# Patient Record
Sex: FEMALE | NOT HISPANIC OR LATINO | Employment: FULL TIME
[De-identification: names, ages, dates, MRNs, and addresses within clinical notes are randomized per-mention and may not be internally consistent; named-entity substitution may affect disease eponyms.]

---

## 2017-07-08 ENCOUNTER — HEALTH MAINTENANCE LETTER (OUTPATIENT)
Age: 37
End: 2017-07-08

## 2017-11-12 ENCOUNTER — HEALTH MAINTENANCE LETTER (OUTPATIENT)
Age: 37
End: 2017-11-12

## 2018-04-16 ENCOUNTER — TRANSFERRED RECORDS (OUTPATIENT)
Dept: HEALTH INFORMATION MANAGEMENT | Facility: CLINIC | Age: 38
End: 2018-04-16

## 2019-09-30 PROBLEM — N92.1 IRREGULAR INTERMENSTRUAL BLEEDING: Status: ACTIVE | Noted: 2019-09-30

## 2019-09-30 PROBLEM — R10.2 PELVIC PAIN: Status: ACTIVE | Noted: 2019-09-30

## 2019-10-04 ENCOUNTER — ANESTHESIA EVENT (OUTPATIENT)
Dept: SURGERY | Facility: CLINIC | Age: 39
End: 2019-10-04
Payer: COMMERCIAL

## 2019-10-04 ENCOUNTER — ANESTHESIA (OUTPATIENT)
Dept: SURGERY | Facility: CLINIC | Age: 39
End: 2019-10-04
Payer: COMMERCIAL

## 2019-10-04 ENCOUNTER — HOSPITAL ENCOUNTER (OUTPATIENT)
Facility: CLINIC | Age: 39
Discharge: HOME | End: 2019-10-04
Attending: OBSTETRICS & GYNECOLOGY | Admitting: OBSTETRICS & GYNECOLOGY
Payer: COMMERCIAL

## 2019-10-04 VITALS
RESPIRATION RATE: 14 BRPM | DIASTOLIC BLOOD PRESSURE: 78 MMHG | WEIGHT: 165.3 LBS | SYSTOLIC BLOOD PRESSURE: 122 MMHG | BODY MASS INDEX: 26.57 KG/M2 | OXYGEN SATURATION: 96 % | TEMPERATURE: 97.7 F | HEART RATE: 72 BPM | HEIGHT: 66 IN

## 2019-10-04 DIAGNOSIS — N92.1 IRREGULAR INTERMENSTRUAL BLEEDING: Primary | ICD-10-CM

## 2019-10-04 LAB
ABO + RH BLD: NORMAL
ABO + RH BLD: NORMAL
B-HCG SERPL-ACNC: <1 IU/L (ref 0–5)
BLD GP AB SCN SERPL QL: NORMAL
BLOOD BANK CMNT PATIENT-IMP: NORMAL
HGB BLD-MCNC: 13.1 G/DL (ref 11.7–15.7)
SPECIMEN EXP DATE BLD: NORMAL

## 2019-10-04 PROCEDURE — 25000128 H RX IP 250 OP 636: Performed by: OBSTETRICS & GYNECOLOGY

## 2019-10-04 PROCEDURE — 40000170 ZZH STATISTIC PRE-PROCEDURE ASSESSMENT II: Performed by: OBSTETRICS & GYNECOLOGY

## 2019-10-04 PROCEDURE — 25000125 ZZHC RX 250: Performed by: OBSTETRICS & GYNECOLOGY

## 2019-10-04 PROCEDURE — 25800030 ZZH RX IP 258 OP 636: Performed by: ANESTHESIOLOGY

## 2019-10-04 PROCEDURE — 27210794 ZZH OR GENERAL SUPPLY STERILE: Performed by: OBSTETRICS & GYNECOLOGY

## 2019-10-04 PROCEDURE — 85018 HEMOGLOBIN: CPT | Performed by: OBSTETRICS & GYNECOLOGY

## 2019-10-04 PROCEDURE — 36415 COLL VENOUS BLD VENIPUNCTURE: CPT | Performed by: OBSTETRICS & GYNECOLOGY

## 2019-10-04 PROCEDURE — 86900 BLOOD TYPING SEROLOGIC ABO: CPT | Performed by: OBSTETRICS & GYNECOLOGY

## 2019-10-04 PROCEDURE — 86901 BLOOD TYPING SEROLOGIC RH(D): CPT | Performed by: OBSTETRICS & GYNECOLOGY

## 2019-10-04 PROCEDURE — 36000058 ZZH SURGERY LEVEL 3 EA 15 ADDTL MIN: Performed by: OBSTETRICS & GYNECOLOGY

## 2019-10-04 PROCEDURE — 71000013 ZZH RECOVERY PHASE 1 LEVEL 1 EA ADDTL HR: Performed by: OBSTETRICS & GYNECOLOGY

## 2019-10-04 PROCEDURE — 37000008 ZZH ANESTHESIA TECHNICAL FEE, 1ST 30 MIN: Performed by: OBSTETRICS & GYNECOLOGY

## 2019-10-04 PROCEDURE — 36000056 ZZH SURGERY LEVEL 3 1ST 30 MIN: Performed by: OBSTETRICS & GYNECOLOGY

## 2019-10-04 PROCEDURE — 88305 TISSUE EXAM BY PATHOLOGIST: CPT | Mod: 26 | Performed by: OBSTETRICS & GYNECOLOGY

## 2019-10-04 PROCEDURE — 37000009 ZZH ANESTHESIA TECHNICAL FEE, EACH ADDTL 15 MIN: Performed by: OBSTETRICS & GYNECOLOGY

## 2019-10-04 PROCEDURE — 71000012 ZZH RECOVERY PHASE 1 LEVEL 1 FIRST HR: Performed by: OBSTETRICS & GYNECOLOGY

## 2019-10-04 PROCEDURE — 88302 TISSUE EXAM BY PATHOLOGIST: CPT | Performed by: OBSTETRICS & GYNECOLOGY

## 2019-10-04 PROCEDURE — 25000125 ZZHC RX 250: Performed by: NURSE ANESTHETIST, CERTIFIED REGISTERED

## 2019-10-04 PROCEDURE — 25000128 H RX IP 250 OP 636: Performed by: NURSE ANESTHETIST, CERTIFIED REGISTERED

## 2019-10-04 PROCEDURE — 25000128 H RX IP 250 OP 636: Performed by: ANESTHESIOLOGY

## 2019-10-04 PROCEDURE — 88305 TISSUE EXAM BY PATHOLOGIST: CPT | Performed by: OBSTETRICS & GYNECOLOGY

## 2019-10-04 PROCEDURE — 86850 RBC ANTIBODY SCREEN: CPT | Performed by: OBSTETRICS & GYNECOLOGY

## 2019-10-04 PROCEDURE — 71000027 ZZH RECOVERY PHASE 2 EACH 15 MINS: Performed by: OBSTETRICS & GYNECOLOGY

## 2019-10-04 PROCEDURE — 25000132 ZZH RX MED GY IP 250 OP 250 PS 637: Performed by: OBSTETRICS & GYNECOLOGY

## 2019-10-04 PROCEDURE — 25000566 ZZH SEVOFLURANE, EA 15 MIN: Performed by: OBSTETRICS & GYNECOLOGY

## 2019-10-04 PROCEDURE — 88302 TISSUE EXAM BY PATHOLOGIST: CPT | Mod: 26 | Performed by: OBSTETRICS & GYNECOLOGY

## 2019-10-04 PROCEDURE — 84702 CHORIONIC GONADOTROPIN TEST: CPT | Performed by: OBSTETRICS & GYNECOLOGY

## 2019-10-04 RX ORDER — ONDANSETRON 4 MG/1
4 TABLET, ORALLY DISINTEGRATING ORAL EVERY 30 MIN PRN
Status: DISCONTINUED | OUTPATIENT
Start: 2019-10-04 | End: 2019-10-04 | Stop reason: HOSPADM

## 2019-10-04 RX ORDER — CEFAZOLIN SODIUM 1 G/3ML
1 INJECTION, POWDER, FOR SOLUTION INTRAMUSCULAR; INTRAVENOUS SEE ADMIN INSTRUCTIONS
Status: DISCONTINUED | OUTPATIENT
Start: 2019-10-04 | End: 2019-10-04 | Stop reason: HOSPADM

## 2019-10-04 RX ORDER — KETOROLAC TROMETHAMINE 30 MG/ML
INJECTION, SOLUTION INTRAMUSCULAR; INTRAVENOUS PRN
Status: DISCONTINUED | OUTPATIENT
Start: 2019-10-04 | End: 2019-10-04

## 2019-10-04 RX ORDER — BUPIVACAINE HYDROCHLORIDE 5 MG/ML
INJECTION, SOLUTION EPIDURAL; INTRACAUDAL
Status: DISCONTINUED
Start: 2019-10-04 | End: 2019-10-04 | Stop reason: HOSPADM

## 2019-10-04 RX ORDER — SODIUM CHLORIDE, SODIUM LACTATE, POTASSIUM CHLORIDE, CALCIUM CHLORIDE 600; 310; 30; 20 MG/100ML; MG/100ML; MG/100ML; MG/100ML
INJECTION, SOLUTION INTRAVENOUS CONTINUOUS
Status: DISCONTINUED | OUTPATIENT
Start: 2019-10-04 | End: 2019-10-04 | Stop reason: HOSPADM

## 2019-10-04 RX ORDER — FENTANYL CITRATE 50 UG/ML
25-50 INJECTION, SOLUTION INTRAMUSCULAR; INTRAVENOUS EVERY 5 MIN PRN
Status: DISCONTINUED | OUTPATIENT
Start: 2019-10-04 | End: 2019-10-04 | Stop reason: HOSPADM

## 2019-10-04 RX ORDER — CEFAZOLIN SODIUM 2 G/100ML
2 INJECTION, SOLUTION INTRAVENOUS
Status: COMPLETED | OUTPATIENT
Start: 2019-10-04 | End: 2019-10-04

## 2019-10-04 RX ORDER — ONDANSETRON 2 MG/ML
INJECTION INTRAMUSCULAR; INTRAVENOUS PRN
Status: DISCONTINUED | OUTPATIENT
Start: 2019-10-04 | End: 2019-10-04

## 2019-10-04 RX ORDER — LIDOCAINE HYDROCHLORIDE 20 MG/ML
INJECTION, SOLUTION INFILTRATION; PERINEURAL PRN
Status: DISCONTINUED | OUTPATIENT
Start: 2019-10-04 | End: 2019-10-04

## 2019-10-04 RX ORDER — ONDANSETRON 2 MG/ML
4 INJECTION INTRAMUSCULAR; INTRAVENOUS EVERY 30 MIN PRN
Status: DISCONTINUED | OUTPATIENT
Start: 2019-10-04 | End: 2019-10-04 | Stop reason: HOSPADM

## 2019-10-04 RX ORDER — IBUPROFEN 600 MG/1
600 TABLET, FILM COATED ORAL EVERY 6 HOURS PRN
Qty: 30 TABLET | Refills: 0 | Status: SHIPPED | OUTPATIENT
Start: 2019-10-04

## 2019-10-04 RX ORDER — NALOXONE HYDROCHLORIDE 0.4 MG/ML
.1-.4 INJECTION, SOLUTION INTRAMUSCULAR; INTRAVENOUS; SUBCUTANEOUS
Status: DISCONTINUED | OUTPATIENT
Start: 2019-10-04 | End: 2019-10-04 | Stop reason: HOSPADM

## 2019-10-04 RX ORDER — FENTANYL CITRATE 50 UG/ML
INJECTION, SOLUTION INTRAMUSCULAR; INTRAVENOUS PRN
Status: DISCONTINUED | OUTPATIENT
Start: 2019-10-04 | End: 2019-10-04

## 2019-10-04 RX ORDER — HYDROMORPHONE HYDROCHLORIDE 2 MG/1
2 TABLET ORAL EVERY 4 HOURS PRN
Qty: 15 TABLET | Refills: 0 | Status: SHIPPED | OUTPATIENT
Start: 2019-10-04

## 2019-10-04 RX ORDER — PROPOFOL 10 MG/ML
INJECTION, EMULSION INTRAVENOUS CONTINUOUS PRN
Status: DISCONTINUED | OUTPATIENT
Start: 2019-10-04 | End: 2019-10-04

## 2019-10-04 RX ORDER — PROPOFOL 10 MG/ML
INJECTION, EMULSION INTRAVENOUS PRN
Status: DISCONTINUED | OUTPATIENT
Start: 2019-10-04 | End: 2019-10-04

## 2019-10-04 RX ORDER — DEXAMETHASONE SODIUM PHOSPHATE 4 MG/ML
INJECTION, SOLUTION INTRA-ARTICULAR; INTRALESIONAL; INTRAMUSCULAR; INTRAVENOUS; SOFT TISSUE PRN
Status: DISCONTINUED | OUTPATIENT
Start: 2019-10-04 | End: 2019-10-04

## 2019-10-04 RX ORDER — HYDROMORPHONE HYDROCHLORIDE 1 MG/ML
.3-.5 INJECTION, SOLUTION INTRAMUSCULAR; INTRAVENOUS; SUBCUTANEOUS EVERY 10 MIN PRN
Status: DISCONTINUED | OUTPATIENT
Start: 2019-10-04 | End: 2019-10-04 | Stop reason: HOSPADM

## 2019-10-04 RX ORDER — ACETAMINOPHEN 325 MG/1
975 TABLET ORAL ONCE
Status: COMPLETED | OUTPATIENT
Start: 2019-10-04 | End: 2019-10-04

## 2019-10-04 RX ORDER — BUPIVACAINE HYDROCHLORIDE 5 MG/ML
INJECTION, SOLUTION PERINEURAL PRN
Status: DISCONTINUED | OUTPATIENT
Start: 2019-10-04 | End: 2019-10-04 | Stop reason: HOSPADM

## 2019-10-04 RX ORDER — KETOROLAC TROMETHAMINE 30 MG/ML
30 INJECTION, SOLUTION INTRAMUSCULAR; INTRAVENOUS ONCE
Status: DISCONTINUED | OUTPATIENT
Start: 2019-10-04 | End: 2019-10-04 | Stop reason: HOSPADM

## 2019-10-04 RX ORDER — VECURONIUM BROMIDE 1 MG/ML
INJECTION, POWDER, LYOPHILIZED, FOR SOLUTION INTRAVENOUS PRN
Status: DISCONTINUED | OUTPATIENT
Start: 2019-10-04 | End: 2019-10-04

## 2019-10-04 RX ADMIN — SODIUM CHLORIDE, POTASSIUM CHLORIDE, SODIUM LACTATE AND CALCIUM CHLORIDE: 600; 310; 30; 20 INJECTION, SOLUTION INTRAVENOUS at 07:42

## 2019-10-04 RX ADMIN — ONDANSETRON 4 MG: 2 INJECTION INTRAMUSCULAR; INTRAVENOUS at 07:50

## 2019-10-04 RX ADMIN — FENTANYL CITRATE 50 MCG: 0.05 INJECTION, SOLUTION INTRAMUSCULAR; INTRAVENOUS at 09:16

## 2019-10-04 RX ADMIN — LIDOCAINE HYDROCHLORIDE 100 MG: 20 INJECTION, SOLUTION INFILTRATION; PERINEURAL at 07:44

## 2019-10-04 RX ADMIN — FENTANYL CITRATE 50 MCG: 50 INJECTION, SOLUTION INTRAMUSCULAR; INTRAVENOUS at 07:44

## 2019-10-04 RX ADMIN — CEFAZOLIN SODIUM 2 G: 2 INJECTION, SOLUTION INTRAVENOUS at 07:50

## 2019-10-04 RX ADMIN — VECURONIUM BROMIDE 2 MG: 1 INJECTION, POWDER, LYOPHILIZED, FOR SOLUTION INTRAVENOUS at 08:25

## 2019-10-04 RX ADMIN — PROPOFOL 200 MG: 10 INJECTION, EMULSION INTRAVENOUS at 07:44

## 2019-10-04 RX ADMIN — FENTANYL CITRATE 50 MCG: 0.05 INJECTION, SOLUTION INTRAMUSCULAR; INTRAVENOUS at 09:44

## 2019-10-04 RX ADMIN — SUGAMMADEX 150 MG: 100 INJECTION, SOLUTION INTRAVENOUS at 08:49

## 2019-10-04 RX ADMIN — DEXAMETHASONE SODIUM PHOSPHATE 4 MG: 4 INJECTION, SOLUTION INTRA-ARTICULAR; INTRALESIONAL; INTRAMUSCULAR; INTRAVENOUS; SOFT TISSUE at 07:50

## 2019-10-04 RX ADMIN — ACETAMINOPHEN 975 MG: 325 TABLET, FILM COATED ORAL at 06:38

## 2019-10-04 RX ADMIN — ROCURONIUM BROMIDE 10 MG: 10 INJECTION INTRAVENOUS at 08:13

## 2019-10-04 RX ADMIN — FENTANYL CITRATE 50 MCG: 50 INJECTION, SOLUTION INTRAMUSCULAR; INTRAVENOUS at 08:13

## 2019-10-04 RX ADMIN — ROCURONIUM BROMIDE 40 MG: 10 INJECTION INTRAVENOUS at 07:44

## 2019-10-04 RX ADMIN — SODIUM CHLORIDE, POTASSIUM CHLORIDE, SODIUM LACTATE AND CALCIUM CHLORIDE: 600; 310; 30; 20 INJECTION, SOLUTION INTRAVENOUS at 08:42

## 2019-10-04 RX ADMIN — KETOROLAC TROMETHAMINE 30 MG: 30 INJECTION, SOLUTION INTRAMUSCULAR at 08:47

## 2019-10-04 RX ADMIN — PROPOFOL 75 MCG/KG/MIN: 10 INJECTION, EMULSION INTRAVENOUS at 07:44

## 2019-10-04 RX ADMIN — MIDAZOLAM 1 MG: 1 INJECTION INTRAMUSCULAR; INTRAVENOUS at 07:44

## 2019-10-04 ASSESSMENT — LIFESTYLE VARIABLES: TOBACCO_USE: 0

## 2019-10-04 ASSESSMENT — MIFFLIN-ST. JEOR: SCORE: 1436.55

## 2019-10-04 ASSESSMENT — COPD QUESTIONNAIRES: COPD: 0

## 2019-10-04 NOTE — ANESTHESIA CARE TRANSFER NOTE
Patient: Melina A Vazquez    Procedure(s):  HYSTEROSCOPY, WITH DILATION AND CURETTAGE OF UTERUS,  POLYPECTOMY USING GRIDER AND NEPHEW MORCELLATOR  DIAGNOSTIC LAPAROSCOPY WITH left salpingectomy    Diagnosis: ABNORMAL UTERINE BLEEDING AND PELVIC CRAMPING, ENDOMETRIAL POLYPS  Diagnosis Additional Information: No value filed.    Anesthesia Type:   General, ETT     Note:  Airway :Face Mask  Patient transferred to:PACU  Handoff Report: Identifed the Patient, Identified the Reponsible Provider, Reviewed the pertinent medical history, Discussed the surgical course, Reviewed Intra-OP anesthesia mangement and issues during anesthesia, Set expectations for post-procedure period and Allowed opportunity for questions and acknowledgement of understanding      Vitals: (Last set prior to Anesthesia Care Transfer)    CRNA VITALS  10/4/2019 0830 - 10/4/2019 0906      10/4/2019             Pulse:  77    SpO2:  97 %    Resp Rate (set):  10                Electronically Signed By: ALYSON Gutiérrez CRNA  October 4, 2019  9:06 AM

## 2019-10-04 NOTE — ANESTHESIA POSTPROCEDURE EVALUATION
Patient: Melina A Vazquez    Procedure(s):  HYSTEROSCOPY, WITH DILATION AND CURETTAGE OF UTERUS,  POLYPECTOMY USING GRIDER AND NEPHEW MORCELLATOR  DIAGNOSTIC LAPAROSCOPY WITH left salpingectomy    Diagnosis:ABNORMAL UTERINE BLEEDING AND PELVIC CRAMPING, ENDOMETRIAL POLYPS  Diagnosis Additional Information: No value filed.    Anesthesia Type:  General, ETT    Note:  Anesthesia Post Evaluation    Patient location during evaluation: Phase 2  Patient participation: Able to fully participate in evaluation  Level of consciousness: awake and alert  Pain management: adequate  Airway patency: patent  Cardiovascular status: acceptable  Respiratory status: acceptable  Hydration status: acceptable  PONV: none     Anesthetic complications: None          Last vitals:  Vitals:    10/04/19 1015 10/04/19 1030 10/04/19 1100   BP: 104/75 102/67 122/78   Pulse: 63 72    Resp: 10 11 14   Temp:      SpO2: 94% 95% 96%         Electronically Signed By: Bobby Reynolds MD  October 4, 2019  12:16 PM

## 2019-10-04 NOTE — ANESTHESIA PREPROCEDURE EVALUATION
Anesthesia Pre-Procedure Evaluation    Patient: Melina Vazquez   MRN: [id number] : 1980          Preoperative Diagnosis: ABNORMAL UTERINE BLEEDING AND PELVIC CRAMPING, ENDOMETRIAL POLYPS    Procedure(s):  HYSTEROSCOPY, WITH DILATION AND CURETTAGE OF UTERUS,  POLYPECTOMY USING GRIDER AND NEPHEW MORCELLATOR  DIAGNOSTIC LAPAROSCOPY WITH POSSIBLE REMOVAL OF ENDOMETRIOSIS    Past Medical History:   Diagnosis Date     Anxiety and depression      Arthritis     foot     Cancer of lip      CYP2D6 deficiency (H)      Dalton-Danlos syndrome     POSSIBLE     Irregular menstrual bleeding      Pelvic cramping      Past Surgical History:   Procedure Laterality Date     ENT SURGERY      T & A     GYN SURGERY       laparoscopy- Left Tubal Cyst     ORTHOPEDIC SURGERY      FOOT SURGERY- 6 LEFT, 1 RIGHT     SOFT TISSUE SURGERY      MOH'S SURGERY- LIP       Anesthesia Evaluation     . Pt has had prior anesthetic. Type: General    No history of anesthetic complications          ROS/MED HX    ENT/Pulmonary:      (-) tobacco use, asthma and COPD   Neurologic:      (-) CVA, TIA and Neuropathy   Cardiovascular:        (-) hypertension, CAD, irregular heartbeat/palpitations and stent   METS/Exercise Tolerance:     Hematologic:        (-) anemia   Musculoskeletal: Comment: Chronic joint pain  Possible Dalton Danlos        GI/Hepatic:        (-) GERD and liver disease   Renal/Genitourinary:      (-) renal disease   Endo: Comment: [id number] slow metabolizer     (-) Type I DM, Type II DM and thyroid disease   Psychiatric:     (+) psychiatric history anxiety and depression      Infectious Disease:  - neg infectious disease ROS       Malignancy:   (+) Malignancy History of Skin          Other: Comment: Endometrial polyp  Abnormal uterine bleeding                           Physical Exam  Normal systems: cardiovascular, pulmonary and dental    Airway   Mallampati: II  TM distance: >3 FB  Neck ROM: full    Dental     Cardiovascular   Rhythm  "and rate: regular and normal      Pulmonary    breath sounds clear to auscultation            Lab Results   Component Value Date    WBC 5.7 12/18/2014    HGB 13.3 12/18/2014    HCT 40.8 12/18/2014     12/18/2014     04/11/2012    POTASSIUM 3.7 04/11/2012    CHLORIDE 104 04/11/2012    CO2 22 04/11/2012    BUN 19 04/11/2012    CR 0.71 04/11/2012     (H) 04/11/2012    AFSHAN 9.5 04/11/2012    ALBUMIN 4.3 04/11/2012    PROTTOTAL 7.1 04/11/2012    ALT 32 04/11/2012    AST 24 04/11/2012    ALKPHOS 93 04/11/2012    BILITOTAL 0.4 04/11/2012    LIPASE 94 04/11/2012    TSH 1.1 10/17/2013    HCG Negative 10/12/2007    HCGS Negative 04/11/2012       Preop Vitals  BP Readings from Last 3 Encounters:   10/04/19 115/81   12/18/14 103/68   11/14/14 106/80    Pulse Readings from Last 3 Encounters:   12/18/14 89   11/14/14 73   10/09/14 72      Resp Readings from Last 3 Encounters:   10/04/19 16   11/14/14 14   04/11/12 16    SpO2 Readings from Last 3 Encounters:   10/04/19 96%   04/11/12 95%      Temp Readings from Last 1 Encounters:   10/04/19 36.2  C (97.1  F) (Temporal)    Ht Readings from Last 1 Encounters:   10/04/19 1.676 m (5' 6\")      Wt Readings from Last 1 Encounters:   10/04/19 75 kg (165 lb 4.8 oz)    Estimated body mass index is 26.68 kg/m  as calculated from the following:    Height as of this encounter: 1.676 m (5' 6\").    Weight as of this encounter: 75 kg (165 lb 4.8 oz).       Anesthesia Plan      History & Physical Review  History and physical reviewed and following examination; no interval change.    ASA Status:  2 .    NPO Status:  > 6 hours    Plan for General and ETT with Intravenous induction.   PONV prophylaxis:  Ondansetron (or other 5HT-3) and Dexamethasone or Solumedrol       Postoperative Care  Postoperative pain management:  Oral pain medications and IV analgesics.      Consents  Anesthetic plan, risks, benefits and alternatives discussed with:  Patient..                 Bobby Reynolds, " MD

## 2019-10-04 NOTE — BRIEF OP NOTE
[facility] Brief Operative Note    Pre-operative diagnosis: ABNORMAL UTERINE BLEEDING AND PELVIC CRAMPING, ENDOMETRIAL POLYPS   Post-operative diagnosis same   Procedure: Procedure(s):  HYSTEROSCOPY, WITH DILATION AND CURETTAGE OF UTERUS,  POLYPECTOMY USING GRIDER AND NEPHEW MORCELLATOR  DIAGNOSTIC LAPAROSCOPY WITH left salpingectomy   Surgeon(s): Surgeon(s) and Role:  Panel 1:     * Nelia Claudio MD - Primary  Panel 2:     * Nelia Claudio MD - Primary   Estimated blood loss: 5 mL    Specimens: ID Type Source Tests Collected by Time Destination   A : Endometrial Curettings Tissue Endometrium SURGICAL PATHOLOGY EXAM Nelia Claudio MD 10/4/2019  8:14 AM    B : left fallopin tube Tissue Fallopian Tube, Left SURGICAL PATHOLOGY EXAM Nelia Claudio MD 10/4/2019  8:36 AM       Findings: Normal uterine cavity; pelvis with no endometriosis; right ovarian cyst 4cm drained; left tube blunted and moderate hydrosalpinx so removed; normal appendix and upper abdomen.    No complications; pt to Recovery room in stable condition.    Nelia Claudio M.D.  October 4, 2019   9:17 AM

## 2019-10-07 LAB — COPATH REPORT: NORMAL

## 2019-10-23 ENCOUNTER — TRANSFERRED RECORDS (OUTPATIENT)
Dept: HEALTH INFORMATION MANAGEMENT | Facility: CLINIC | Age: 39
End: 2019-10-23

## 2020-03-02 ENCOUNTER — HEALTH MAINTENANCE LETTER (OUTPATIENT)
Age: 40
End: 2020-03-02

## 2020-07-17 ENCOUNTER — HOSPITAL ENCOUNTER (OUTPATIENT)
Dept: PHYSICAL THERAPY | Facility: CLINIC | Age: 40
Setting detail: THERAPIES SERIES
End: 2020-07-17
Attending: FAMILY MEDICINE
Payer: COMMERCIAL

## 2020-07-17 PROCEDURE — 97162 PT EVAL MOD COMPLEX 30 MIN: CPT | Mod: GP | Performed by: PHYSICAL THERAPIST

## 2020-07-17 PROCEDURE — 97110 THERAPEUTIC EXERCISES: CPT | Mod: GP | Performed by: PHYSICAL THERAPIST

## 2020-07-17 ASSESSMENT — 6 MINUTE WALK TEST (6MWT): TOTAL DISTANCE WALKED (FT): 1668

## 2020-07-18 NOTE — PROGRESS NOTES
07/17/20 0900   Quick Adds   Type of Visit Initial OP PT Evaluation   General Information   Start of Care Date 07/17/20   Referring Physician Dr Marium Antony ([facility], P: [phone], F: [phone])   Orders Evaluate and Treat as Indicated   Order Date 07/02/20   Medical Diagnosis hEDS with suspected neck instability   Onset of illness/injury or Date of Surgery 07/02/20   Surgical/Medical history reviewed Yes   Pertinent history of current problem (include personal factors and/or comorbidities that impact the POC) Pt with diagnosis of hypermobility type EDS, presenting today for whole body management of her symptoms. Pt with notable B foot injuries, 6 on R side (including needing to dig out a calcaneal screw and reposition it), and R foot 2 surgeries. Pt also with notable skin sensitivity resulting in whole foot peeling with hard surfaces. Pt notes she had FOs when she was younger, but had to stop using them due to being too hard and causig foot peeling. Willing to try something new, but worried about the skin implications. Pt notes that due to foot surgeries, she only wears wedge shoes - too much pain with walking flat footed. Pt notes that she feels like she has instability in B knees, no pain in hips or back. Pt will also have discomfort in R shoulder in particular when taking shirts off. Also with a lot of pain in R elbow and hand/wrist - seeing Citlalli Soliman for OT carly in a few weeks. 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 Limits are fatigue, pain later in whole body (legs). Pt notes knee instability when she bends over and when going down stairs. Pt notes her father recently had CABGx3 and carotidectomy.   Patient role/Employment history Employed  (realtor)   [facility]   Home/Community  Accessibility Comments stairs - none, one level living. 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 Can do the stairs when showing houses.    ADL Devices Extended Tub Bench  (don't stay standing in the shower, sits on the floor to show)   Assistive Devices Comments Wears wedge shoes   Patient/Family Goals Statement Be able to walk more (walking dogs, staging houses) with less pain.   General Information Comments Exercise most days: Like bands, will sit on shower floor and do them, uses 5lb weights. When I do ab exercises it makes my neck hurt. Bladder pain, no incontinence.   Fall Risk Screen   Fall screen completed by PT   Have you fallen 2 or more times in the past year? No   Have you fallen and had an injury in the past year? No   Is patient a fall risk? No   Fall screen comments 1 fall a few weeks ago. Cautious in the winter when walking.   Pain   Patient currently in pain Yes   Pain location L ankle   Pain rating After walking 5/10, best 2/10, worst 10/10, avg 5.7/10   Pain comments Will rest and elevate when in pain. Has a TENS, but not using it.  Neck is straight - had whiplash - got more neck pain from the PT than when she was on her own.   Vitals Signs   Heart Rate 76   Blood Pressure 133/90   Vital Signs Comments Standin/91, 81. After 6MWT: 134/95, HR 77   Cognitive Status Examination   Orientation orientation to person, place and time   Level of Consciousness alert   Follows Commands and Answers Questions 100% of the time;able to follow multistep instructions   Personal Safety and Judgment intact   Memory intact   Integumentary   Integumentary Comments Pt notes her feet will peel with friction/contact with hard surfaces. Pt notes that entire pad under 1st met head will slough off. Had a lot of trouble with this when she played basketball as a youth. Has tried softer ankle supports and this caused too much rubbing and then skin sloughing.   Posture   Posture Comments Preferred  sitting with legs crossed, hooked around (like eagle pose in yoga). Post pelvic tilt, rounded lumbar spine. Standing with L hemipelvis higher than R.   Range of Motion (ROM)   ROM Comment Pt is hypermobile in all joints except for ankles which have had partial fusions. Did not check full extent due to wanting to prevent injury. Pt notes especially painful/hypermobile in wrists, fingers, and B shoulders - R side worse than L (pt is R handed).   Strength   Strength Comments Pt is grossly 4+/5 throughout.   Transfer Skills   Transfer Comments Pt is able to safely and easily stand from standard height chairs.   Gait   Gait Comments Pt amb with decreased daija, decreased eccentric control L ankle with heel strike, occasionally mild hyperext B knees with stance phase, increased pelvic motion/decreased pelvic stability, less UE swing R.   Gait Special Tests 25 Foot Timed Walk   Seconds 5.59   Gait Special Tests Six Minute Walk Test   Feet 1668 Feet   Modality Interventions   Planned Modality Interventions Cryotherapy;TENS   Planned Therapy Interventions   Planned Therapy Interventions balance training;gait training;neuromuscular re-education;orthotic fitting/training;strengthening;transfer training;manual therapy   Clinical Impression   Criteria for Skilled Therapeutic Interventions Met yes, treatment indicated   PT Diagnosis chronic pain with decreased IADL engagement   Influenced by the following impairments diffuse hypermobility, decreased functional strength/stability, chronic pain.   Functional limitations due to impairments Decreased ability to engage in work tasks as a realtor (walking for multiple hours, lifting/carrying for staging houses).   Clinical Presentation Evolving/Changing   Clinical Presentation Rationale Pain has been progressing over time, highly motivated to learn self management.   Clinical Decision Making (Complexity) Moderate complexity   Therapy Frequency 1 time/week  (taper as appropriate)    Predicted Duration of Therapy Intervention (days/wks) 90 days   Risk & Benefits of therapy have been explained Yes   Patient, Family & other staff in agreement with plan of care Yes   Goal 1   Goal Identifier 6MWT   Goal Description Pt demo improved strength and endurance to be able to complete 6MWT >1751' (5% improved) with pain <4/10 B LEs.   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   Target Date 10/14/20   Goal 3   Goal Identifier Bending/lifting/carrying   Goal Description Pt demo improved form for bending/lifting/carrying items for staging houses to reduce strain on hypermobile joints, resulting in increased endurance and less pain.   Target Date 10/14/20   Goal 4   Goal Identifier Walking dogs   Goal Description Pt report ability to walk the dogs for 1.5 miles at least 3x per week with pain in LEs <4/10 as result of compensation strategies and increased strength.   Target Date 10/14/20   Goal 5   Goal Identifier HEP   Goal Description Pt demo indep with progressive HEP for strength, stability, and endurance.   Target Date 10/14/20   Total Evaluation Time   PT Eval, Moderate Complexity Minutes (18077) 45       Evelyn Whelan DPT, NCS  Physical Therapist     [facility] [place] [address]  [place] [address]  [email or web address]  Office: [phone]  Pager: [phone]  Fax: [phone]  Gender Pronouns: she/her  Employed by [employer]

## 2020-07-18 NOTE — PROGRESS NOTES
20 0900   Signing Clinician's Name / Credentials   Signing clinician's name / credentials Evelyn Whelan, DENZELT, NCS   Session Number   Session Number [email or web address]   Goal 1   Goal Identifier 6MWT   Goal Description Pt demo improved strength and endurance to be able to complete 6MWT >1751' (5% improved) with pain <4/10 B LEs.   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   Target Date 10/14/20   Goal 3   Goal Identifier Bending/lifting/carrying   Goal Description Pt demo improved form for bending/lifting/carrying items for staging houses to reduce strain on hypermobile joints, resulting in increased endurance and less pain.   Target Date 10/14/20   Goal 4   Goal Identifier Walking dogs   Goal Description Pt report ability to walk the dogs for 1.5 miles at least 3x per week with pain in LEs <4/10 as result of compensation strategies and increased strength.   Target Date 10/14/20   Goal 5   Goal Identifier HEP   Goal Description Pt demo indep with progressive HEP for strength, stability, and endurance.   Target Date 10/14/20   Subjective Report   Subjective Report See eval.   Objective Measure 1   Objective Measure 25' walk   Details 5.59 sec   Objective Measure 2   Objective Measure 6MWT   Details 1668'   Vitals Signs   Heart Rate 76   Blood Pressure 133/90   Vital Signs Comments Standin/91, 81. After 6MWT: 134/95, HR 77   Therapeutic Procedure/exercise   Therapeutic Procedures: strength, endurance, ROM, flexibillity minutes (11461) 15   Skilled Intervention HEP initation   Patient Response Demo understanding   Treatment Detail Pt is already in habit of doing some strengthening on the floor of the shower every day. She has been using bands and a 5lb weight. Educ pt re: need to ensure neutral joint alignment,  that goal is building endurance for muscles to hold her body within normal ranges. Instructed pt in supine cervical retraction with towel roll for neutral cervical positioning 5 sec holds/1.5 mins; TA set with marching - cues for hands on pelvis for feedback about stability, 1.5 mins; Hip abd with blue band 5 sec holds/1.5 mins; hip add 50% effort with ball 5 secs/1.5 mins. Issued handout. Pt demo understanding and willing to work on these daily - instructed to add 10secs per day until at 3 mins, goal is no increase in pain.   Education   Learner Patient   Readiness Eager   Method Booklet/handout;Explanation;Demonstration   Response Verbalizes Understanding;Demonstrates Understanding   Education Comments HEP initiation, PT role and goals.   Communication with other professionals   Communication with other professionals Daniel Orthotist re: FOs vs AFOs, skin challenges. Dr Antony re: orders for FOs, AFOs.   Plan   Home program [facility] (Evelyn): cervical retraction, TA set with marching, hip abd blue band, hip add 50% effort.   Plan for next session Check pelvic alignment and instruct in self correction if needed. Progress HEP. Initiate postural education (pt tends to hook legs around each other in sitting.    Comments   Comments Evelyn: see if pt willing to have picture of sitting posture for inservice.   Total Session Time   Timed Code Treatment Minutes 15   Total Treatment Time (sum of timed and untimed services) 60   AMBULATORY CLINICS ONLY-MEDICAL AND TREATMENT DIAGNOSIS   Medical Diagnosis hEDS with suspected neck instability   PT Diagnosis chronic pain with decreased IADL engagement

## 2020-08-04 ENCOUNTER — THERAPY VISIT (OUTPATIENT)
Dept: OCCUPATIONAL THERAPY | Facility: CLINIC | Age: 40
End: 2020-08-04
Payer: COMMERCIAL

## 2020-08-04 DIAGNOSIS — M25.531 PAIN IN BOTH WRISTS: Primary | ICD-10-CM

## 2020-08-04 DIAGNOSIS — M25.532 PAIN IN BOTH WRISTS: Primary | ICD-10-CM

## 2020-08-04 DIAGNOSIS — M79.642 BILATERAL HAND PAIN: ICD-10-CM

## 2020-08-04 DIAGNOSIS — Q79.60 EDS (EHLERS-DANLOS SYNDROME): ICD-10-CM

## 2020-08-04 DIAGNOSIS — M79.641 BILATERAL HAND PAIN: ICD-10-CM

## 2020-08-04 PROCEDURE — 97165 OT EVAL LOW COMPLEX 30 MIN: CPT | Mod: GO | Performed by: OCCUPATIONAL THERAPIST

## 2020-08-04 PROCEDURE — 97760 ORTHOTIC MGMT&TRAING 1ST ENC: CPT | Mod: GO | Performed by: OCCUPATIONAL THERAPIST

## 2020-08-04 NOTE — PROGRESS NOTES
Hand Therapy Initial Evaluation  Current Date:  8/4/2020    Diagnosis:  Bilateral  elbow, wrist and hand Bilateral  hand Pain, Dalton Danlos Syndrome  DOI: about 2 years ago  MD:7/6/20    Referring MD:Marium Antony MD    Initial Subjective:  Melina Vazquez is a 40 year old  right  hand dominant female.  R elbow, B wrists, and fingers, R is worse per pt. She notes R side is more painful.   Patient reports symptoms of pain, weakness/loss of strength, numbness and tingling  of the bilateral  elbow, wrist and hand  which occurred due to hypermobility of joints. Since onset symptoms are Gradually getting worse.  Special tests:  CT for the chest and none for the hands/wrists.  Previous treatment: none.    General health as reported by patient is good.  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  Medication history:. 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[employer], 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, to move the furniture,   Notes need to work conservation skills as she tends to work and then be too fatigued rest of the day;  Writing issues, overhead tasks  can be fatiguing    Functional Outcome Measure:  See flowsheet      Objective:  Observation and Pt Comments/Goals: opening jars, writing, wrist and hand and thumb strength, and adaptive tools      Pain Level (Scale 0-10):   8/4/2020   At Rest 2-3 to 4   With Use 9     Pain Description:  Date 8/4/2020    bilateral    Location  elbow, wrist, hand and thumb   Pain Quality Aching, Exhausting, Miserable, Nagging, Numb, Penetrating, Sharp, Shooting, Stabbing, Throbbing, Tingling and Tiring   Frequency intermittent or daily     Pain is worst Daytime, can awake her at night   Exacerbated by Use during the day   Relieved by heat (has a hot tub and hot showers) and rest   Progression Not getting better           Beighton Hypermobility Scale:  Beighton Hypermobility Scale    Date: 8/4/2020   Bilateral Elbow (2) 2   Bilateral Knees (2) 2   Bilateral Thumbs (2) 1   Bilateral Small finger HE (2) 2   Bend and touch floor with flat palms (1) 1   Score: (0-9/9) 8       ROM:  NOTE: Wrist ROM is WNL all planes. Noted below is pain with end range  Wrist 8/4/2020 8/4/2020   AROM(PROM) Right Left   Extension WNL WNL   Flexion WNL +with Over pressure WNL   RD WNL WNL   UD WNL WNL   Supination WNL WNL   Pronation WNL WNL      ROM: Fingers: Noted to be  WNL    Comments on Deviation or hyperextension noted: MCP: all MCPS are hypermobile    Pt comments on stability of fingers: notes fingers tend to snap and pop to get them to work at times.     Hyperextension noted per joint:  Present = +/ Absent = -  8/4/2020  Hyperextension PIP DIP   Index R:+  L:+ R:-  L:-   MF R: +  L:+ R:-  L:-   RF R:+  L:+ R:-  L:-   Sf R:+  L:+ R:-  L:-   Thumb IP R:  L:     Thumb MP R:  L:        .ROM: Thumb ROM is WNL all planes, not noting hyper extension, pt notes thumbs are painful and seem to snap at times.     Thumb Observation/Appearance:  Key: + = present/ - = not observed    8/4/2020     Right Left   Shoulder deformity present over CMC mild mild   Dorsal  subluxation evident at CMC     Edema over the CMC joint none none   Noted collapse of MP into hyperextension during pinch Only IP Only IP   1st DI MMT (0-5/5) 5 5          Radial Wrist Zone: Provocative Tests: 8/4/2020    Pain Report:  - none    + mild    ++ moderate    +++ severe  Right Left   CMC grind test     CMC joint line palpation + +   Extension Stress Test + -   ADDuction Stress Test - -   CMC Joint AP laxity + +   Finkelstein's Test     STT Test (OA of same)         Special Tests: MN/UN/RN Wrist & Elbow   + indicates mild pain, ++ indicates moderate pain, +++ indicates severe pain   8/4/2020    +/- for pain: grade of pain 0-10/10 Right Left   Tinels at Carpal Tunnel     Tinels at flexor/pronator junction + -   Tinels at cubital tunnel     Tinels at [place]     Median nerve compression at wrist Carpal Tunnel     Phalens 60' - -   MN: Vincent test for lumbrical incursion  (fist x 30 secs) - -           Strength   (Measured in pounds) [one trial only]  Pain Report:  + mild    ++ moderate    +++ severe    8/4/2020 8/4/2020   Trials Right Left   1 40 35      Lat Pinch 8/4/2020 8/4/2020   Trials Right Left   1 9 12      3 Pt Pinch 8/4/2020 8/4/2020   Trials Right Left   1 9 10         Edema:none of the  wrist, hand and thumb  Sensation  Decreased Median Nerve distribution per pt report and Intermittently    Assessment:  Patient presents with symptoms consistent with diagnosis as above,  with conservative intervention. Special testing during evaluation indicating hyper mobility of joints/.    Patient's limitations or Problem List includes:  Pain, Weakness and Decreased stability of the bilateral elbow, wrist, hand and thumb which interferes with the patient's ability to perform Self Care Tasks (dressing, eating, bathing, hygiene/toileting), Work Tasks, Sleep Patterns, Recreational Activities and Household Chores as compared to previous level of function.    Rehab Potential:  Good - Return to full  activity, some limitations    Patient will benefit from skilled Occupational Therapy to increase overall strength,  strength, pinch strength, forearm strength, stability of wrist and stability of thumb and decrease pain to return to previous activity level and resume normal daily tasks and to reach their rehab potential.    Barriers to Learning:  No barrier    Communication Issues:  Patient appears to be able to clearly communicate and understand verbal and written communication and follow directions correctly.  Assessment of Occupational Performance:  5 or more Performance Deficits  Identified Performance Deficits: bathing/showering, dressing, hygiene and grooming, home establishment and management, meal preparation and cleanup, sleep, work and leisure activities      Clinical Decision Making (Complexity): Low complexity  Treatment Explanation:  The following has been discussed with the patient:  RX ordered/plan of care  Anticipated outcomes  Possible risks and side effects    Plan:  Frequency:  1 X week, once daily  Duration:  for 8 weeks  Treatment Plan:  Therapeutic Exercise:  Tendon Gliding, Isometrics and Stabilization  Neuromuscular re-education:  Kinesthetic Training, Proprioceptive Training, Kinesiotaping and Stabilization  Manual Techniques:  Myofascial release  Self Care:  Ergonomic Considerations; joint protection  Orthotic Fabrication: Static forearm based orthosis bilateral    Discharge Plan:  Achieve all LTG.  Independent in home treatment program.  Reach maximal therapeutic benefit.    Home Exercise Program:  Wear wrist splints at night and as needed for daytime tasks    Next Visit:  Progress to finger orthoses  Wrist stabilization  Thumb stabilization, elbow stabilization/strengthening  Joint protection and adaptive equipment  Work conservation techniques

## 2020-08-06 ENCOUNTER — HOSPITAL ENCOUNTER (OUTPATIENT)
Dept: PHYSICAL THERAPY | Facility: CLINIC | Age: 40
Setting detail: THERAPIES SERIES
End: 2020-08-06
Attending: FAMILY MEDICINE
Payer: COMMERCIAL

## 2020-08-06 PROCEDURE — 97112 NEUROMUSCULAR REEDUCATION: CPT | Mod: GP | Performed by: PHYSICAL THERAPIST

## 2020-08-06 PROCEDURE — 97110 THERAPEUTIC EXERCISES: CPT | Mod: GP | Performed by: PHYSICAL THERAPIST

## 2020-08-21 ENCOUNTER — HOSPITAL ENCOUNTER (OUTPATIENT)
Dept: PHYSICAL THERAPY | Facility: CLINIC | Age: 40
Setting detail: THERAPIES SERIES
End: 2020-08-21
Attending: FAMILY MEDICINE
Payer: COMMERCIAL

## 2020-08-21 PROCEDURE — 97535 SELF CARE MNGMENT TRAINING: CPT | Mod: GP | Performed by: PHYSICAL THERAPIST

## 2020-08-21 PROCEDURE — 97110 THERAPEUTIC EXERCISES: CPT | Mod: GP | Performed by: PHYSICAL THERAPIST

## 2020-08-21 NOTE — DISCHARGE INSTRUCTIONS
Right elbow pain  - apply ice to the right elbow 20 mins, 2-3x per day (alternative is ice massage direct on the skin for 5 mins/until skin in numb)  - trial antiinflammatory (ibprofen or aleve)

## 2020-08-24 ENCOUNTER — THERAPY VISIT (OUTPATIENT)
Dept: OCCUPATIONAL THERAPY | Facility: CLINIC | Age: 40
End: 2020-08-24
Payer: COMMERCIAL

## 2020-08-24 DIAGNOSIS — M79.641 BILATERAL HAND PAIN: ICD-10-CM

## 2020-08-24 DIAGNOSIS — M79.642 BILATERAL HAND PAIN: ICD-10-CM

## 2020-08-24 DIAGNOSIS — M25.531 PAIN IN BOTH WRISTS: Primary | ICD-10-CM

## 2020-08-24 DIAGNOSIS — Q79.60 EDS (EHLERS-DANLOS SYNDROME): ICD-10-CM

## 2020-08-24 DIAGNOSIS — M25.532 PAIN IN BOTH WRISTS: Primary | ICD-10-CM

## 2020-08-24 PROCEDURE — 97140 MANUAL THERAPY 1/> REGIONS: CPT | Mod: GO | Performed by: OCCUPATIONAL THERAPIST

## 2020-08-24 PROCEDURE — 97530 THERAPEUTIC ACTIVITIES: CPT | Mod: GO | Performed by: OCCUPATIONAL THERAPIST

## 2020-08-24 PROCEDURE — 97110 THERAPEUTIC EXERCISES: CPT | Mod: GO | Performed by: OCCUPATIONAL THERAPIST

## 2020-08-24 PROCEDURE — 97112 NEUROMUSCULAR REEDUCATION: CPT | Mod: GO | Performed by: OCCUPATIONAL THERAPIST

## 2020-08-24 NOTE — PROGRESS NOTES
Hand Therapy SOAP Note  Current Date:  8/24/2020      Diagnosis:  Bilateral  elbow, wrist and hand Bilateral  hand Pain, Dalton Danlos Syndrome  DOI: about 2 years ago  MD:7/6/20    Referring MD:Marium Antony MD    S:  Pt notes pain in the R elbow with some finger numbness of the ring and small at times        Occupational Profile Information:  Current occupation is realtor  Currently working in normal job without restrictions  Job Tasks: Computer Work, Driving, Prolonged Sitting, Prolonged Standing, Repetitive Tasks  Prior functional level:  no limitations  Barriers include:none  Mobility: No difficulty  Transportation: drives  Leisure activities/hobbies: eating, did enjoy basketball and mountain biking, fishing, boating, out to dinner, puzzles, traveling, likes to work out, does this in the shower  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[employer], 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, to move the furniture,   Notes need to work conservation skills as she tends to work and then be too fatigued rest of the day;  Writing issues, overhead tasks can be fatiguing    Functional Outcome Measure:  See flowsheet      Objective:  Observation and Pt Comments/Goals: opening jars, writing, wrist and hand and thumb strength, and adaptive tools      Pain Level (Scale 0-10):   8/4/2020   At Rest 2-3 to 4   With Use 9     Pain Description:  Date 8/4/2020    bilateral    Location  elbow, wrist, hand and thumb   Pain Quality Aching, Exhausting, Miserable, Nagging, Numb, Penetrating, Sharp, Shooting, Stabbing, Throbbing, Tingling and Tiring   Frequency intermittent or daily     Pain is worst Daytime, can awake her at night   Exacerbated by Use during the day   Relieved by heat (has a hot tub and hot showers) and rest   Progression Not getting better           Beighton Hypermobility Scale:  Beighton Hypermobility Scale    Date: 8/4/2020   Bilateral Elbow (2) 2   Bilateral Knees (2) 2   Bilateral Thumbs (2) 1   Bilateral Small finger HE (2) 2   Bend and touch floor  with flat palms (1) 1   Score: (0-9/9) 8       ROM:  NOTE: Wrist ROM is WNL all planes. Noted below is pain with end range  Wrist 8/4/2020 8/4/2020   AROM(PROM) Right Left   Extension WNL WNL   Flexion WNL +with Over pressure WNL   RD WNL WNL   UD WNL WNL   Supination WNL WNL   Pronation WNL WNL      ROM: Fingers: Noted to be  WNL    Comments on Deviation or hyperextension noted: MCP: all MCPS are hypermobile    Pt comments on stability of fingers: notes fingers tend to snap and pop to get them to work at times.     Hyperextension noted per joint:  Present = +/ Absent = -  8/4/2020  Hyperextension PIP DIP   Index R:+  L:+ R:-  L:-   MF R: +  L:+ R:-  L:-   RF R:+  L:+ R:-  L:-   Sf R:+  L:+ R:-  L:-   Thumb IP R:  L:     Thumb MP R:  L:        .ROM: Thumb ROM is WNL all planes, not noting hyper extension, pt notes thumbs are painful and seem to snap at times.     Thumb Observation/Appearance:  Key: + = present/ - = not observed    8/4/2020     Right Left   Shoulder deformity present over CMC mild mild   Dorsal subluxation evident at CMC     Edema over the CMC joint none none   Noted collapse of MP into hyperextension during pinch Only IP Only IP   1st DI MMT (0-5/5) 5 5          Radial Wrist Zone: Provocative Tests: 8/4/2020    Pain Report:  - none    + mild    ++ moderate    +++ severe  Right Left   CMC grind test     CMC joint line palpation + +   Extension Stress Test + -   ADDuction Stress Test - -   CMC Joint AP laxity + +   Finkelstein's Test     STT Test (OA of same)         Special Tests: MN/UN/RN Wrist & Elbow   + indicates mild pain, ++ indicates moderate pain, +++ indicates severe pain   8/4/2020 8/24/20   +/- for pain: grade of pain 0-10/10 Right Left    Tinels at Carpal Tunnel      Tinels at flexor/pronator junction + -    Tinels at cubital tunnel   +   Tinels at [place]   +   Median nerve compression at wrist Carpal Tunnel      Phalens 60' - -    MN: Vincent test for lumbrical incursion  (fist x  30 secs) - -        Brachioplexopathy Palpation Sites   Pain Report:  - none    + mild    ++ moderate    +++ severe   8/24/2020 Comments:    Right    RN: Posterior Triangular space -    RN: Proximal Humerus @ Humeral groove +    RN -  Elbow Medial to BR +    RN: PIN Site +    RN:DRSN Radial wrist + with tinels          Provocative Test: + ECRB sign and PIN sign with + Middle finger resistance     Strength   (Measured in pounds) [one trial only]  Pain Report:  + mild    ++ moderate    +++ severe    8/4/2020 8/4/2020   Trials Right Left   1 40 35      Lat Pinch 8/4/2020 8/4/2020   Trials Right Left   1 9 12      3 Pt Pinch 8/4/2020 8/4/2020   Trials Right Left   1 9 10         Edema:none of the  wrist, hand and thumb  Sensation  Decreased Median Nerve distribution per pt report and Intermittently    Assessment:  Pt is demonstrates possible ulnar neuropathy and pain at the R lateral elbow with pain along the RN pathway.     Plan:  Frequency:  1 X week, once daily  Duration:  for 8 weeks  Treatment Plan:  Therapeutic Exercise:  Tendon Gliding, Isometrics and Stabilization  Neuromuscular re-education:  Kinesthetic Training, Proprioceptive Training, Kinesiotaping and Stabilization  Manual Techniques:  Myofascial release  Self Care:  Ergonomic Considerations; joint protection  Orthotic Fabrication: Static forearm based orthosis bilateral    Discharge Plan:  Achieve all LTG.  Independent in home treatment program.  Reach maximal therapeutic benefit.    Home Exercise Program:  Wear wrist splints at night and as needed for daytime tasks  8/24/2020  Exercise Name: Orthosis Wear and Care, Notes: Wear your splint to support your hand and remove for showers and for exercises 3-5x/day, wear at night for sure, and for resting  Use your hand for light activities: eating, dressing, showering, No heavy use of your hand.  Clean with mild soap and water. Keep out of heated car or trunk, or other heat source.  Exercise Name: TENNIS  "ELBOW PREVENTION  Exercise Name: Cubital Tunnel Prevention, Notes: Consider a lapdesk for keyboard (separate) and wear the elbow padded sleeve at night  Exercise Name: Forearm Passive Range of Motion Extensor Stretch, Sets: 1 set - Reps: 3 reps, Notes: Start with your elbow at your side, then gradually increase straightening of the elbow, minding the pain, don't push into the pain. I recommend the \"power\" of 3 repetitions for stretching.By the 3rd stretch the muscles will feel more loose.   At the end of the stretch, ANGLE your hand toward your same hip. Hold each stretch for 15-30 seconds each.      Exercise Name: Forearm PROM Advanced Flexor Stretch, Sets: 1 set - Reps: 3 reps - Sessions: 2-3x/day;, Notes: KEEP YOUR SHOULDER RELAXED AND DOWN. Start with elbow bent   I recommend the \"power\" of 3 repetitions for stretching.By the 3rd stretch the muscles will feel more loose.   Hold each stretch for 15-30 seconds each.   Exercise Name: Ball Massage  Exercise Name: Ball Massage to Extensors, Notes: USE doggy (small) or a tennis ball over muscles, or ask for assistance from \"loved one\"  to do this.  Find the tender spot and hold and do small circles over that spot until it is less tender.  Exercise Name: Ball Massage to Flexors - Sessions: 1-2 x / day, Notes: Stay in the muscle belly region. USE a hard, rubber doggy (small) or a other ball to massage the muscles, or ask for assistance from \"loved one\"  Exercise Name: Nerve Gliding Distal Ulnar , Sets: 1 - Reps: 8 - Sessions: 2-3, Notes:  Stand at  posture, exercise both arms, one at a time;  don't push into \"buzzing\" of the nerve, just a mild traction, just to tension  Exercise Name: Nerve Gliding Proximal Radial, Sets: 3 - Reps: 3 - Sessions: 3, Notes: Waiters Tip: You can do this sitting or standing  tilt head toward R side, as you are gliding the arm/nerve, DO NOT TILT HEAD AWAY    Next Visit:  Progress to finger orthoses  Wrist stabilization  Thumb " stabilization, elbow stabilization/strengthening  Joint protection and adaptive equipment  Work conservation techniques

## 2020-09-11 ENCOUNTER — HOSPITAL ENCOUNTER (OUTPATIENT)
Dept: PHYSICAL THERAPY | Facility: CLINIC | Age: 40
Setting detail: THERAPIES SERIES
End: 2020-09-11
Attending: FAMILY MEDICINE
Payer: COMMERCIAL

## 2020-09-11 PROCEDURE — 97530 THERAPEUTIC ACTIVITIES: CPT | Mod: GP | Performed by: PHYSICAL THERAPIST

## 2020-09-11 NOTE — DISCHARGE INSTRUCTIONS
Look and see if you have leg weights. If not, it might be worth investing in a set of adjustable weights.    Example: https://www.amazon.com/[employer]-Ankle-Weights-Adjusted-Weight/dp/S7441VOBUA/ref=asc_df_B0747ZTVML/?tag=hyprod-20&linkCode=df0&mxsymb=457036071584&hvpos=&hvnetw=g&dfzbir=[email or web address]&hvpone=&hvptwo=&hvqmt=&hvdev=c&hvdvcmdl=&hvlocint=&baryuvvj=[email or web address]&hvtargid=[id number]&psc=1        Reaching below your navel   - make sure you bend with your knees AND your back

## 2020-09-25 ENCOUNTER — HOSPITAL ENCOUNTER (OUTPATIENT)
Dept: PHYSICAL THERAPY | Facility: CLINIC | Age: 40
Setting detail: THERAPIES SERIES
End: 2020-09-25
Attending: FAMILY MEDICINE
Payer: COMMERCIAL

## 2020-09-25 PROCEDURE — 97530 THERAPEUTIC ACTIVITIES: CPT | Mod: GP | Performed by: PHYSICAL THERAPIST

## 2020-09-25 PROCEDURE — 97110 THERAPEUTIC EXERCISES: CPT | Mod: GP | Performed by: PHYSICAL THERAPIST

## 2020-10-08 ENCOUNTER — THERAPY VISIT (OUTPATIENT)
Dept: OCCUPATIONAL THERAPY | Facility: CLINIC | Age: 40
End: 2020-10-08
Payer: COMMERCIAL

## 2020-10-08 DIAGNOSIS — Q79.60 EDS (EHLERS-DANLOS SYNDROME): ICD-10-CM

## 2020-10-08 DIAGNOSIS — M79.641 BILATERAL HAND PAIN: Primary | ICD-10-CM

## 2020-10-08 DIAGNOSIS — M79.642 BILATERAL HAND PAIN: Primary | ICD-10-CM

## 2020-10-08 DIAGNOSIS — M25.531 PAIN IN BOTH WRISTS: ICD-10-CM

## 2020-10-08 DIAGNOSIS — M25.532 PAIN IN BOTH WRISTS: ICD-10-CM

## 2020-10-08 PROCEDURE — 97535 SELF CARE MNGMENT TRAINING: CPT | Mod: GO | Performed by: OCCUPATIONAL THERAPIST

## 2020-10-08 PROCEDURE — 97763 ORTHC/PROSTC MGMT SBSQ ENC: CPT | Mod: GO | Performed by: OCCUPATIONAL THERAPIST

## 2020-10-23 ENCOUNTER — HOSPITAL ENCOUNTER (OUTPATIENT)
Dept: PHYSICAL THERAPY | Facility: CLINIC | Age: 40
Setting detail: THERAPIES SERIES
End: 2020-10-23
Attending: FAMILY MEDICINE
Payer: COMMERCIAL

## 2020-10-23 PROCEDURE — 97110 THERAPEUTIC EXERCISES: CPT | Mod: GP | Performed by: PHYSICAL THERAPIST

## 2020-10-23 NOTE — IP AVS SNAPSHOT
MRN:[id number]                      After Visit Summary   10/23/2020    Melina Vazquez    MRN: [id number]           Visit Information        Provider Department      10/23/2020  8:15 AM Yani Whelan PT [facility]          Further instructions from your care team       Print me    MyChart Information    MyChart gives you secure access to your electronic health record. If you see a primary care provider, you can also send messages to your care team and make appointments. If you have questions, please call your primary care clinic.  If you do not have a primary care provider, please call [phone] and they will assist you.       Care EveryWhere ID    This is your Care EveryWhere ID. This could be used by other organizations to access your [facility] medical records  [id number]       Equal Access to Services    LENARD KING : Bettina Mesa, waaxda luqadaha, qaybta kaalmaanya ng, sundar deras. So [facility] [phone].    ATENCIÓN: Si habla español, tiene a swanson disposición servicios gratuitos de asistencia lingüística. Llame al [phone].    We comply with applicable federal and state civil rights laws, including the [place] Human Rights Act. We do not discriminate on the basis of race, color, creed, Synagogue, national origin, marital status, age, disability, sex, sexual orientation, or gender identity.

## 2020-10-25 NOTE — PROGRESS NOTES
Outpatient Physical Therapy Progress Note     Patient: Melina Vazquez  : 1980    Beginning/End Dates of Reporting Period:  2020 to 10/23/2020    Referring Provider: Dr Marium Antony ([facility], P: [phone], F: [phone])    Therapy Diagnosis: chronic pain with decreased IADL engagement     Client Self Report: One of my dogs is in a lot of pain so awaiting an MRI. 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 My foot has been bad, but to be expected.     Objective Measurements:  Objective Measure: 6MWT  Details: 1850'     Goals:  Goal Identifier 6MWT   Goal Description Pt demo improved strength and endurance to be able to complete 6MWT >1751' (5% improved) with pain <4/10 B LEs.   Target Date 10/14/20   Date Met  10/23/20   Progress: See above.     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   Target Date 10/14/20   Date Met  20   Progress:     Goal Identifier Bending/lifting/carrying   Goal Description Pt demo improved form for bending/lifting/carrying items for staging houses to reduce strain on hypermobile joints, resulting in increased endurance and less pain.   Target Date 10/14/20   Date Met  20   Progress:     Goal Identifier Walking dogs   Goal Description Pt report ability to walk the dogs for 1.5 miles at least 3x per week with pain in LEs <4/10 as result of compensation strategies and increased strength.   Target Date 21   Date Met   ONGOING   Progress: Has not been regularly walking dogs because of injury for her dog. Pt will work on walking herself while her dog is not up to walking.     Goal Identifier HEP   Goal Description Pt demo indep with progressive HEP for strength, stability, and endurance.   Target Date 21   Date Met   ONGOING   Progress: Pt has been  very consistent about her HEP and is progressing in challenge each session. Have not maxed out her HEP at this point.       Progress Toward Goals:   Progress this reporting period: improving pain management and stamina. Have not achieved goals of walking dogs regularly, nor maxed out her maintaince HEP. Continue 90 days from end of last goal date - to 1/12/2021.    Plan:  Continue therapy per current plan of care. Plan to see 1x per month until 1/12/2021.    Discharge:  No

## 2020-11-20 ENCOUNTER — HOSPITAL ENCOUNTER (OUTPATIENT)
Dept: PHYSICAL THERAPY | Facility: CLINIC | Age: 40
Setting detail: THERAPIES SERIES
End: 2020-11-20
Attending: FAMILY MEDICINE
Payer: COMMERCIAL

## 2020-11-20 PROCEDURE — 97110 THERAPEUTIC EXERCISES: CPT | Mod: GP | Performed by: PHYSICAL THERAPIST

## 2020-11-21 NOTE — PROGRESS NOTES
Outpatient Physical Therapy Discharge Note     Patient: Melina Vazquez  : 1980    Beginning/End Dates of Reporting Period:  2020 to 2020 (7 sessions)    Referring Provider: Dr Marium Antony ([facility], P: [phone], F: [phone])    Therapy Diagnosis: chronic pain with decreased IADL engagement     Client Self Report: Been feeling really awful the last couple of weeks. Hands, forearms, knees. I don't think that I have really done anything to make the pain happen - it seems to happen every spring and fall for a few weeks.    Objective Measurements:  Objective Measure: 6MWT  Details: 1850'(10/23/2020)     Goals:  Goal Identifier 6MWT   Goal Description Pt demo improved strength and endurance to be able to complete 6MWT >1751' (5% improved) with pain <4/10 B LEs.   Target Date 10/14/20   Date Met  10/23/20   Progress: See above.     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   Target Date 10/14/20   Date Met  20   Progress: Pt notes that overall that work for staging has been much improved.     Goal Identifier Bending/lifting/carrying   Goal Description Pt demo improved form for bending/lifting/carrying items for staging houses to reduce strain on hypermobile joints, resulting in increased endurance and less pain.   Target Date 10/14/20   Date Met  20   Progress:     Goal Identifier Walking dogs   Goal Description Pt report ability to walk the dogs for 1.5 miles at least 3x per week with pain in LEs <4/10 as result of compensation strategies and increased strength.   Target Date 21   Date Met   NOT MET   Progress: Limited due to dogs not feeling well. She has been able to walk herself with pain <4/10.     Goal Identifier HEP   Goal Description Pt demo indep with progressive HEP for strength, stability, and  endurance.   Target Date 01/12/21   Date Met  11/20/20   Progress: Pt is very dedicated to her HEP, has been making excellent progressions.       Progress Toward Goals:   Progress this reporting period: increased whole body strength to be able to better engage in work and household tasks, HEP progression.    Plan:  Discharge from therapy.    Discharge:    Reason for Discharge: Patient has met all goals.    Discharge Plan: Patient to continue home program. Recommend renewed focus on hand therapy for strengthening.

## 2020-11-25 ENCOUNTER — TELEPHONE (OUTPATIENT)
Dept: CARDIOLOGY | Facility: CLINIC | Age: 40
End: 2020-11-25

## 2020-11-25 NOTE — TELEPHONE ENCOUNTER
Health Call Center    Phone Message    May a detailed message be left on voicemail: yes     Reason for Call: Other: Melina would like to cancel her appointment on 12/3/20 with the [facility] clinic. The writer does not schedule appointments for the  [facility] appointment type, so sending over an encounter for review. The appointment was scheduled by Marybel Salamanca. At this time, Melian would like to cancel and not reschedule. Please give Melina a call back if there are any questions.     Action Taken: Message routed to:  [facility] ([facility]):  Cardio    Travel Screening: Not Applicable

## 2020-11-27 ENCOUNTER — TELEPHONE (OUTPATIENT)
Dept: CARDIOLOGY | Facility: CLINIC | Age: 40
End: 2020-11-27

## 2020-11-27 NOTE — TELEPHONE ENCOUNTER
Spoke with Melina and she said she was told that she would need to see an MD that specifically works with patients with EDS( Ehler Danlos syndrome). She will see that provider and than if she needs to see Dr. Ulloa she will call me back to reschedule.

## 2020-12-03 ENCOUNTER — THERAPY VISIT (OUTPATIENT)
Dept: OCCUPATIONAL THERAPY | Facility: CLINIC | Age: 40
End: 2020-12-03
Payer: COMMERCIAL

## 2020-12-03 DIAGNOSIS — M25.531 PAIN IN BOTH WRISTS: ICD-10-CM

## 2020-12-03 DIAGNOSIS — M25.532 PAIN IN BOTH WRISTS: ICD-10-CM

## 2020-12-03 DIAGNOSIS — Q79.60 EDS (EHLERS-DANLOS SYNDROME): ICD-10-CM

## 2020-12-03 DIAGNOSIS — M79.641 BILATERAL HAND PAIN: Primary | ICD-10-CM

## 2020-12-03 DIAGNOSIS — M79.642 BILATERAL HAND PAIN: Primary | ICD-10-CM

## 2020-12-03 PROCEDURE — 97763 ORTHC/PROSTC MGMT SBSQ ENC: CPT | Mod: GO | Performed by: OCCUPATIONAL THERAPIST

## 2020-12-03 PROCEDURE — 97140 MANUAL THERAPY 1/> REGIONS: CPT | Mod: GO | Performed by: OCCUPATIONAL THERAPIST

## 2020-12-03 NOTE — LETTER
[facility]  [address] [address]  [phone]    2020    Re: Melina Vazquez   :   1980  MRN:  [id number]   REFERRING PHYSICIAN:   Marium SMITH [facility]  Date of Initial Evaluation:  20  Visits:  Rxs Used: 4  Reason for Referral:     Pain in both wrists  EDS (Dalton-Danlos syndrome)  Bilateral hand pain    EVALUATION SUMMARY    Hand Therapy Progress Note  Current Date:  12/3/2020  Reporting period: 2020 to current date    Diagnosis:  Bilateral  elbow, wrist and hand Bilateral  hand Pain, Dalton Danlos Syndrome  DOI: about 2 years ago  MD:20    Referring MD:Marium Antony MD    S:  Subjective changes as noted by patient: Subjective: elbow pain is less but fingers feel weak, I keep dropping things. I am working hard to strengthen. Pt ask difference between weakness and pain and joint protection.   Functional changes noted by patient: No Change to Household Chores  Response to previous treatment:  fair  Patient has noted adverse reaction to:   None    Occupational Profile Information:  Current occupation is [employer]  Currently working in normal job without restrictions  Job Tasks: Computer Work, Driving, Prolonged Sitting, Prolonged Standing, Repetitive Tasks  Prior functional level:  no limitations  Barriers include:none  Mobility: No difficulty  Transportation: drives  Leisure activities/hobbies: eating, did enjoy basketball and mountain biking, fishing, boating, out to dinner, puzzles, traveling, likes to work out, does this in the shower  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[employer], 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, to move the furniture,   Notes need to work conservation skills as she tends to work and then be too fatigued rest of the day;  Writing issues, overhead tasks can be fatiguing    Functional Outcome Measure:  See flowsheet    Objective:  Observation and Pt Comments/Goals: opening jars, writing, wrist and hand  and thumb strength, and adaptive tools      Pain Level (Scale 0-10):   2020 12/3/20   At Rest 2-3 to 4 5-6 * pt notes she is having a flair   With Use 9 7-9     Pain Description:  Date 2020 12/3/20    bilateral     Location  elbow, wrist, hand and thumb    Pain Quality Aching, Exhausting, Miserable, Nagging, Numb, Penetrating, Sharp, Shooting, Stabbing, Throbbing, Tingling and Tiring I am dropping things, pains are about the same, seems worse, seasonally   Frequency intermittent or daily      Pain is worst Daytime, can awake her at night    Exacerbated by Use during the day    Relieved by heat (has a hot tub and hot showers) and rest    Progression Not getting better Worse in the fall which is now       ROM:  NOTE: Wrist ROM is WNL all planes. Noted below is pain with end range  Wrist 2020    AROM(PROM) Right Left    Extension WNL WNL    Flexion WNL +with Over pressure WNL    RD WNL WNL    UD WNL WNL    Supination WNL WNL    Pronation WNL WNL       ROM: Fingers: Noted to be  WNL    Comments on Deviation or hyperextension noted: MCP: all MCPS are hypermobile    Pt comments on stability of fingers: notes fingers tend to snap and pop to get them to work at times.                           Re: Melina Vazquez   :   1980    Hyperextension noted per joint:  Present = +/ Absent = -  2020  Hyperextension PIP DIP   Index R:+  L:+ R:-  L:-   MF R: +  L:+ R:-  L:-   RF R:+  L:+ R:-  L:-   Sf R:+  L:+ R:-  L:-   Thumb IP R:  L:     Thumb MP R:  L:        .ROM: Thumb ROM is WNL all planes, not noting hyper extension, pt notes thumbs are painful and seem to snap at times.     Thumb Observation/Appearance:  Key: + = present/ - = not observed    2020  12/3/20     Right Left R L   Shoulder deformity present over CMC mild mild + +   Dorsal subluxation evident at CMC       Edema over the CMC joint none none     Noted collapse of MP into hyperextension during pinch Only IP Only IP     1st DI MMT  (0-5/5) 5 5 6+ 6+                                Re: Melina Vazquez   :   1980    Radial Wrist Zone: Provocative Tests: 2020    Pain Report:  - none    + mild    ++ moderate    +++ severe  Right Left   CMC grind test     CMC joint line palpation + +   Extension Stress Test + -   ADDuction Stress Test - -   CMC Joint AP laxity + +   Finkelstein's Test     STT Test (OA of same)       Special Tests: MN/UN/RN Wrist & Elbow   + indicates mild pain, ++ indicates moderate pain, +++ indicates severe pain   2020   +/- for pain: grade of pain 0-10/10 Right Left    Tinels at Carpal Tunnel      Tinels at flexor/pronator junction + -    Tinels at cubital tunnel   +   Tinels at [place]   +   Median nerve compression at wrist Carpal Tunnel      Phalens 60' - -    MN: Vincent test for lumbrical incursion  (fist x 30 secs) - -      Brachioplexopathy Palpation Sites   Pain Report:  - none    + mild    ++ moderate    +++ severe   2020    Right   RN: Posterior Triangular space -   RN: Proximal Humerus @ Humeral groove +   RN -  Elbow Medial to BR +   RN: PIN Site +   RN:DRSN Radial wrist + with tinels         Provocative Test: + ECRB sign and PIN sign with + Middle finger resistance                   Re: Melina Vazquez   :   1980    Strength   (Measured in pounds) [one trial only]  Pain Report:  + mild    ++ moderate    +++ severe    2020 2020 12/3/20    Trials Right Left R L   1 40 35 55 56      Lat Pinch 2020 2020 12/3/20    Trials Right Left R L   1 9 12 15+ 16+      3 Pt Pinch 2020 2020 12/3/20    Trials Right Left R l   1 9 10 7 7      Edema:none of the  wrist, hand and thumb  Sensation  Decreased Median Nerve distribution per pt report and Intermittently    Assessment:  Response to therapy has been improvement to:  Strength:     Response to therapy has been decline in:  Strength: pinch  Pain:  frequency is more, intensity of pain has increased and  duration of pain is increased  Self Care Skills:  Seems harder to hold things    Overall Assessment:  Patient would benefit from continued therapy to achieve rehab potential  STG/LTG:  STGoals have been reviewed and no progress has been made;  see goal sheet for details and changes.  I have re-evaluated this patient and find that the nature, scope, duration and intensity of the therapy is appropriate for the medical condition of the patient.    Plan:  P:  Frequency/Duration:  Recommend continuing with the current treatment plan. 3 X a month, once daily  for 3 months    Recommendations for Continued Therapy  Treatment Plan:    Additions to Treatment Plan -    Orthotic Fabrication: Static CMC stabilization orthosis BUE    Therapeutic Exercise:  Tendon Gliding, Isometrics and Stabilization  Neuromuscular re-education:  Kinesthetic Training, Proprioceptive Training, Kinesiotaping and Stabilization  Manual Techniques:  Myofascial release  Self Care:  Ergonomic Considerations; joint protection  Orthotic Fabrication: Static forearm based orthosis bilateral    Discharge Plan:  Achieve all LTG.  Independent in home treatment program.  Reach maximal therapeutic benefit.          Re: Melina Vazquez   :   1980    Home Exercise Program:  Wear wrist splints at night and as needed for daytime tasks  2020  Exercise Name: Orthosis Wear and Care, Notes: Wear your splint to support your hand and remove for showers and for exercises 3-5x/day, wear at night for sure, and for resting  Use your hand for light activities: eating, dressing, showering, No heavy use of your hand.  Clean with mild soap and water. Keep out of heated car or trunk, or other heat source.  Exercise Name: TENNIS ELBOW PREVENTION  Exercise Name: Cubital Tunnel Prevention, Notes: Consider a lapdesk for keyboard (separate) and wear the elbow padded sleeve at night  Exercise Name: Forearm Passive Range of Motion Extensor Stretch, Sets: 1 set - Reps: 3  "reps, Notes: Start with your elbow at your side, then gradually increase straightening of the elbow, minding the pain, don't push into the pain. I recommend the \"power\" of 3 repetitions for stretching.By the 3rd stretch the muscles will feel more loose.   At the end of the stretch, ANGLE your hand toward your same hip. Hold each stretch for 15-30 seconds each.      Exercise Name: Forearm PROM Advanced Flexor Stretch, Sets: 1 set - Reps: 3 reps - Sessions: 2-3x/day;, Notes: KEEP YOUR SHOULDER RELAXED AND DOWN. Start with elbow bent   I recommend the \"power\" of 3 repetitions for stretching.By the 3rd stretch the muscles will feel more loose.   Hold each stretch for 15-30 seconds each.   Exercise Name: Ball Massage  Exercise Name: Ball Massage to Extensors, Notes: USE doggy (small) or a tennis ball over muscles, or ask for assistance from \"loved one\"  to do this.  Find the tender spot and hold and do small circles over that spot until it is less tender.  Exercise Name: Ball Massage to Flexors - Sessions: 1-2 x / day, Notes: Stay in the muscle belly region. USE a hard, rubber doggy (small) or a other ball to massage the muscles, or ask for assistance from \"loved one\"  Exercise Name: Nerve Gliding Distal Ulnar , Sets: 1 - Reps: 8 - Sessions: 2-3, Notes:  Stand at  posture, exercise both arms, one at a time;  don't push into \"buzzing\" of the nerve, just a mild traction, just to tension  Exercise Name: Nerve Gliding Proximal Radial, Sets: 3 - Reps: 3 - Sessions: 3, Notes: Waiters Tip: You can do this sitting or standing  tilt head toward R side, as you are gliding the arm/nerve, DO NOT TILT HEAD AWAY  12/3/2020  Wear BUE CMC stabilization splint  BUE webspace release with clip    Next Visit:  Progress to finger orthoses  Wrist stabilization  Thumb stabilization, elbow stabilization/strengthening  Joint protection and adaptive equipment  Work conservation techniques    Thank you for your " referral.    INQUIRIES  Therapist: Citlalli Soliman OT  [facility] [place] [address] [address]  Phone: [phone]

## 2020-12-03 NOTE — PROGRESS NOTES
Hand Therapy Progress Note  Current Date:  12/3/2020  Reporting period: 8/24/2020 to current date    Diagnosis:  Bilateral  elbow, wrist and hand Bilateral  hand Pain, Dalton Danlos Syndrome  DOI: about 2 years ago  MD:7/6/20    Referring MD:Marium Antony MD    S:  Subjective changes as noted by patient: Subjective: elbow pain is less but fingers feel weak, I keep dropping things. I am working hard to strengthen. Pt ask difference between weakness and pain and joint protection.   Functional changes noted by patient: No Change to Household Chores  Response to previous treatment:  fair  Patient has noted adverse reaction to:   None          Occupational Profile Information:  Current occupation is [employer]  Currently working in normal job without restrictions  Job Tasks: Computer Work, Driving, Prolonged Sitting, Prolonged Standing, Repetitive Tasks  Prior functional level:  no limitations  Barriers include:none  Mobility: No difficulty  Transportation: drives  Leisure activities/hobbies: eating, did enjoy basketball and mountain biking, fishing, boating, out to dinner, puzzles, traveling, likes to work out, does this in the shower  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[employer], 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, to move the furniture,   Notes need to work conservation skills as she tends to work and then be too fatigued rest of the day;  Writing issues, overhead tasks can be fatiguing    Functional Outcome Measure:  See flowsheet      Objective:  Observation and Pt Comments/Goals: opening jars, writing, wrist and hand and thumb strength, and adaptive tools      Pain Level (Scale 0-10):   8/4/2020 12/3/20   At Rest 2-3 to 4 5-6 * pt notes she is having a flair   With Use 9 7-9     Pain Description:  Date 8/4/2020 12/3/20    bilateral     Location  elbow, wrist, hand and thumb    Pain Quality Aching, Exhausting, Miserable, Nagging, Numb, Penetrating, Sharp, Shooting, Stabbing, Throbbing, Tingling and Tiring I am dropping things, pains are about  the same, seems worse, seasonally   Frequency intermittent or daily      Pain is worst Daytime, can awake her at night    Exacerbated by Use during the day    Relieved by heat (has a hot tub and hot showers) and rest    Progression Not getting better Worse in the fall which is now       ROM:  NOTE: Wrist ROM is WNL all planes. Noted below is pain with end range  Wrist 8/4/2020 8/4/2020    AROM(PROM) Right Left    Extension WNL WNL    Flexion WNL +with Over pressure WNL    RD WNL WNL    UD WNL WNL    Supination WNL WNL    Pronation WNL WNL       ROM: Fingers: Noted to be  WNL    Comments on Deviation or hyperextension noted: MCP: all MCPS are hypermobile    Pt comments on stability of fingers: notes fingers tend to snap and pop to get them to work at times.     Hyperextension noted per joint:  Present = +/ Absent = -  8/4/2020  Hyperextension PIP DIP   Index R:+  L:+ R:-  L:-   MF R: +  L:+ R:-  L:-   RF R:+  L:+ R:-  L:-   Sf R:+  L:+ R:-  L:-   Thumb IP R:  L:     Thumb MP R:  L:        .ROM: Thumb ROM is WNL all planes, not noting hyper extension, pt notes thumbs are painful and seem to snap at times.     Thumb Observation/Appearance:  Key: + = present/ - = not observed    8/4/2020  12/3/20     Right Left R L   Shoulder deformity present over CMC mild mild + +   Dorsal subluxation evident at CMC       Edema over the CMC joint none none     Noted collapse of MP into hyperextension during pinch Only IP Only IP     1st DI MMT (0-5/5) 5 5 6+ 6+          Radial Wrist Zone: Provocative Tests: 8/4/2020    Pain Report:  - none    + mild    ++ moderate    +++ severe  Right Left   CMC grind test     CMC joint line palpation + +   Extension Stress Test + -   ADDuction Stress Test - -   CMC Joint AP laxity + +   Finkelstein's Test     STT Test (OA of same)         Special Tests: MN/UN/RN Wrist & Elbow   + indicates mild pain, ++ indicates moderate pain, +++ indicates severe pain   8/4/2020 8/24/20   +/- for pain: grade of  pain 0-10/10 Right Left    Tinels at Carpal Tunnel      Tinels at flexor/pronator junction + -    Tinels at cubital tunnel   +   Tinels at [place]   +   Median nerve compression at wrist Carpal Tunnel      Phalens 60' - -    MN: Vincent test for lumbrical incursion  (fist x 30 secs) - -        Brachioplexopathy Palpation Sites   Pain Report:  - none    + mild    ++ moderate    +++ severe   8/24/2020    Right   RN: Posterior Triangular space -   RN: Proximal Humerus @ Humeral groove +   RN -  Elbow Medial to BR +   RN: PIN Site +   RN:DRSN Radial wrist + with tinels         Provocative Test: + ECRB sign and PIN sign with + Middle finger resistance     Strength   (Measured in pounds) [one trial only]  Pain Report:  + mild    ++ moderate    +++ severe    8/4/2020 8/4/2020 12/3/20    Trials Right Left R L   1 40 35 55 56      Lat Pinch 8/4/2020 8/4/2020 12/3/20    Trials Right Left R L   1 9 12 15+ 16+      3 Pt Pinch 8/4/2020 8/4/2020 12/3/20    Trials Right Left R l   1 9 10 7 7         Edema:none of the  wrist, hand and thumb  Sensation  Decreased Median Nerve distribution per pt report and Intermittently    Assessment:  Response to therapy has been improvement to:  Strength:     Response to therapy has been decline in:  Strength: pinch  Pain:  frequency is more, intensity of pain has increased and duration of pain is increased  Self Care Skills:  Seems harder to hold things    Overall Assessment:  Patient would benefit from continued therapy to achieve rehab potential  STG/LTG:  STGoals have been reviewed and no progress has been made;  see goal sheet for details and changes.  I have re-evaluated this patient and find that the nature, scope, duration and intensity of the therapy is appropriate for the medical condition of the patient.      Plan:  P:  Frequency/Duration:  Recommend continuing with the current treatment plan. 3 X a month, once daily  for 3 months    Recommendations for Continued  "Therapy  Treatment Plan:    Additions to Treatment Plan -    Orthotic Fabrication: Static CMC stabilization orthosis BUE        Therapeutic Exercise:  Tendon Gliding, Isometrics and Stabilization  Neuromuscular re-education:  Kinesthetic Training, Proprioceptive Training, Kinesiotaping and Stabilization  Manual Techniques:  Myofascial release  Self Care:  Ergonomic Considerations; joint protection  Orthotic Fabrication: Static forearm based orthosis bilateral    Discharge Plan:  Achieve all LTG.  Independent in home treatment program.  Reach maximal therapeutic benefit.      Home Exercise Program:  Wear wrist splints at night and as needed for daytime tasks  8/24/2020  Exercise Name: Orthosis Wear and Care, Notes: Wear your splint to support your hand and remove for showers and for exercises 3-5x/day, wear at night for sure, and for resting  Use your hand for light activities: eating, dressing, showering, No heavy use of your hand.  Clean with mild soap and water. Keep out of heated car or trunk, or other heat source.  Exercise Name: TENNIS ELBOW PREVENTION  Exercise Name: Cubital Tunnel Prevention, Notes: Consider a lapdesk for keyboard (separate) and wear the elbow padded sleeve at night  Exercise Name: Forearm Passive Range of Motion Extensor Stretch, Sets: 1 set - Reps: 3 reps, Notes: Start with your elbow at your side, then gradually increase straightening of the elbow, minding the pain, don't push into the pain. I recommend the \"power\" of 3 repetitions for stretching.By the 3rd stretch the muscles will feel more loose.   At the end of the stretch, ANGLE your hand toward your same hip. Hold each stretch for 15-30 seconds each.      Exercise Name: Forearm PROM Advanced Flexor Stretch, Sets: 1 set - Reps: 3 reps - Sessions: 2-3x/day;, Notes: KEEP YOUR SHOULDER RELAXED AND DOWN. Start with elbow bent   I recommend the \"power\" of 3 repetitions for stretching.By the 3rd stretch the muscles will feel more loose.   " "Hold each stretch for 15-30 seconds each.   Exercise Name: Ball Massage  Exercise Name: Ball Massage to Extensors, Notes: USE doggy (small) or a tennis ball over muscles, or ask for assistance from \"loved one\"  to do this.  Find the tender spot and hold and do small circles over that spot until it is less tender.  Exercise Name: Ball Massage to Flexors - Sessions: 1-2 x / day, Notes: Stay in the muscle belly region. USE a hard, rubber doggy (small) or a other ball to massage the muscles, or ask for assistance from \"loved one\"  Exercise Name: Nerve Gliding Distal Ulnar , Sets: 1 - Reps: 8 - Sessions: 2-3, Notes:  Stand at  posture, exercise both arms, one at a time;  don't push into \"buzzing\" of the nerve, just a mild traction, just to tension  Exercise Name: Nerve Gliding Proximal Radial, Sets: 3 - Reps: 3 - Sessions: 3, Notes: Waiters Tip: You can do this sitting or standing  tilt head toward R side, as you are gliding the arm/nerve, DO NOT TILT HEAD AWAY  12/3/2020  Wear BUE CMC stabilization splint  BUE webspace release with clip      Next Visit:  Progress to finger orthoses  Wrist stabilization  Thumb stabilization, elbow stabilization/strengthening  Joint protection and adaptive equipment  Work conservation techniques          "

## 2020-12-14 ENCOUNTER — HEALTH MAINTENANCE LETTER (OUTPATIENT)
Age: 40
End: 2020-12-14

## 2020-12-15 ENCOUNTER — THERAPY VISIT (OUTPATIENT)
Dept: OCCUPATIONAL THERAPY | Facility: CLINIC | Age: 40
End: 2020-12-15
Payer: COMMERCIAL

## 2020-12-15 DIAGNOSIS — Q79.60 EDS (EHLERS-DANLOS SYNDROME): ICD-10-CM

## 2020-12-15 DIAGNOSIS — M25.531 PAIN IN BOTH WRISTS: ICD-10-CM

## 2020-12-15 DIAGNOSIS — M25.532 PAIN IN BOTH WRISTS: ICD-10-CM

## 2020-12-15 DIAGNOSIS — M79.642 BILATERAL HAND PAIN: Primary | ICD-10-CM

## 2020-12-15 DIAGNOSIS — M79.641 BILATERAL HAND PAIN: Primary | ICD-10-CM

## 2020-12-15 PROCEDURE — 97535 SELF CARE MNGMENT TRAINING: CPT | Mod: GT | Performed by: OCCUPATIONAL THERAPIST

## 2020-12-15 PROCEDURE — 97110 THERAPEUTIC EXERCISES: CPT | Mod: GT | Performed by: OCCUPATIONAL THERAPIST

## 2021-02-11 ENCOUNTER — THERAPY VISIT (OUTPATIENT)
Dept: OCCUPATIONAL THERAPY | Facility: CLINIC | Age: 41
End: 2021-02-11
Payer: COMMERCIAL

## 2021-02-11 DIAGNOSIS — Q79.60 EDS (EHLERS-DANLOS SYNDROME): ICD-10-CM

## 2021-02-11 DIAGNOSIS — M25.532 PAIN IN BOTH WRISTS: Primary | ICD-10-CM

## 2021-02-11 DIAGNOSIS — M25.531 PAIN IN BOTH WRISTS: Primary | ICD-10-CM

## 2021-02-11 DIAGNOSIS — M79.641 BILATERAL HAND PAIN: ICD-10-CM

## 2021-02-11 DIAGNOSIS — M79.642 BILATERAL HAND PAIN: ICD-10-CM

## 2021-02-11 PROCEDURE — 97530 THERAPEUTIC ACTIVITIES: CPT | Mod: GT | Performed by: OCCUPATIONAL THERAPIST

## 2021-02-11 PROCEDURE — 97535 SELF CARE MNGMENT TRAINING: CPT | Mod: GT | Performed by: OCCUPATIONAL THERAPIST

## 2021-02-11 NOTE — PROGRESS NOTES
SOAP note objective information for 2/11/2021.  Please refer to the daily flowsheet for treatment today, total treatment time and time spent performing 1:1 timed codes.        Diagnosis:  Bilateral  elbow, wrist and hand Bilateral  hand Pain, Dalton Danlos Syndrome  DOI: about 2 years ago  MD:7/6/20    Referring MD:Marium Antony MD    S:  See flowsheet  Doing exercises, having a flair, and have been holding off on some of the exercises, noting more fatigue  Did a Cat scan feel worse with the contrast, and do not fel well.   I love the band exercises.     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 And getting lids of jars off, groceries being put away, pt notes she works fast and furious, voices difficulty accepting the difficulties with strength and endurance.   Education in this session:  Pt was  in kitchen and asked to show how she uses kitchen items and to problem solve to reduce hand and wrist and body fatigue/discomfort:   Cutting  edu on posture? edu on posture for keeping back in line and shoulder blades down when reaching, keeping items close to body and not reaching far, use of both hands,   Cutting: use of roller knives/cutters and keeping wrists in neutral  Carrying heaving using both hands.  Thinking: body position, then moving, use of dycem/shelf liner for friction, pouring into sink from the counter edge  Using two hands for pouring coffee, wrist position is kept in neutral           Assessment:  Please refer to the daily flowsheet for treatment today, total treatment time and time spent performing 1:1 timed codes.          Plan:  P:  Frequency/Duration:  Recommend continuing with the current treatment plan. 3 X a month, once daily  for 3 months    Recommendations for Continued Therapy  Treatment Plan:    Additions to Treatment Plan -    Orthotic Fabrication: Static CMC stabilization orthosis BUE        Therapeutic Exercise:  Tendon Gliding, Isometrics and Stabilization  Neuromuscular re-education:  Kinesthetic Training, Proprioceptive Training, Kinesiotaping and Stabilization  Manual Techniques:  Myofascial release  Self Care:  Ergonomic Considerations; joint protection  Orthotic Fabrication: Static forearm based orthosis bilateral    Discharge Plan:  Achieve all LTG.  Independent in home treatment program.  Reach maximal therapeutic benefit.      Home Exercise Program:  Wear wrist splints at night and as needed for daytime tasks  8/24/2020  Exercise Name: Orthosis Wear and Care, Notes: Wear your splint to support your hand and remove for showers and for exercises 3-5x/day, wear at night for sure, and for resting  Use your hand for light activities: eating, dressing, showering, No heavy use of your hand.  Clean with mild soap and water. Keep out of heated car or trunk, or other heat source.  Exercise Name: TENNIS ELBOW PREVENTION  Exercise Name: Cubital Tunnel Prevention, Notes: Consider a lapdesk for keyboard (separate) and wear the  "elbow padded sleeve at night  Exercise Name: Forearm Passive Range of Motion Extensor Stretch, Sets: 1 set - Reps: 3 reps, Notes: Start with your elbow at your side, then gradually increase straightening of the elbow, minding the pain, don't push into the pain. I recommend the \"power\" of 3 repetitions for stretching.By the 3rd stretch the muscles will feel more loose.   At the end of the stretch, ANGLE your hand toward your same hip. Hold each stretch for 15-30 seconds each.      Exercise Name: Forearm PROM Advanced Flexor Stretch, Sets: 1 set - Reps: 3 reps - Sessions: 2-3x/day;, Notes: KEEP YOUR SHOULDER RELAXED AND DOWN. Start with elbow bent   I recommend the \"power\" of 3 repetitions for stretching.By the 3rd stretch the muscles will feel more loose.   Hold each stretch for 15-30 seconds each.   Exercise Name: Ball Massage  Exercise Name: Ball Massage to Extensors, Notes: USE doggy (small) or a tennis ball over muscles, or ask for assistance from \"loved one\"  to do this.  Find the tender spot and hold and do small circles over that spot until it is less tender.  Exercise Name: Ball Massage to Flexors - Sessions: 1-2 x / day, Notes: Stay in the muscle belly region. USE a hard, rubber doggy (small) or a other ball to massage the muscles, or ask for assistance from \"loved one\"  Exercise Name: Nerve Gliding Distal Ulnar , Sets: 1 - Reps: 8 - Sessions: 2-3, Notes:  Stand at  posture, exercise both arms, one at a time;  don't push into \"buzzing\" of the nerve, just a mild traction, just to tension  Exercise Name: Nerve Gliding Proximal Radial, Sets: 3 - Reps: 3 - Sessions: 3, Notes: Waiters Tip: You can do this sitting or standing  tilt head toward R side, as you are gliding the arm/nerve, DO NOT TILT HEAD AWAY  12/3/2020  Wear BUE CMC stabilization splint  BUE webspace release with clip      Next Visit:  Progress to finger orthoses  Wrist stabilization  Thumb stabilization, elbow " stabilization/strengthening  Joint protection and adaptive equipment  Work conservation techniques: continue with grocery items and put away

## 2021-03-04 ENCOUNTER — THERAPY VISIT (OUTPATIENT)
Dept: OCCUPATIONAL THERAPY | Facility: CLINIC | Age: 41
End: 2021-03-04
Payer: COMMERCIAL

## 2021-03-04 DIAGNOSIS — M25.532 PAIN IN BOTH WRISTS: Primary | ICD-10-CM

## 2021-03-04 DIAGNOSIS — Q79.60 EDS (EHLERS-DANLOS SYNDROME): ICD-10-CM

## 2021-03-04 DIAGNOSIS — M79.642 BILATERAL HAND PAIN: ICD-10-CM

## 2021-03-04 DIAGNOSIS — M79.641 BILATERAL HAND PAIN: ICD-10-CM

## 2021-03-04 DIAGNOSIS — M25.531 PAIN IN BOTH WRISTS: Primary | ICD-10-CM

## 2021-03-04 PROCEDURE — 97110 THERAPEUTIC EXERCISES: CPT | Mod: GT | Performed by: OCCUPATIONAL THERAPIST

## 2021-03-04 PROCEDURE — 97535 SELF CARE MNGMENT TRAINING: CPT | Mod: GT | Performed by: OCCUPATIONAL THERAPIST

## 2021-03-04 NOTE — PROGRESS NOTES
Hand Therapy Progress Note  3/4/2021  Initial Visit: 8/4/20  Total Visits: 8    Diagnosis:  Bilateral  elbow, wrist and hand Bilateral  hand Pain, Dalton Danlos Syndrome  DOI: about 2 years ago  MD:7/6/20    Referring MD:Marium Antony MD    S:  Subjective changes as noted by patient: still having a flair and can do the leg exercises and do engage my hands and wrist. Did get theraband. Does combine arm exercises with the leg exercises. Pt notes she has less numbness and tingling and less elbow pain as she was pushing through, now is minding the sx and will use more ergonomic maneuvers to reduce her pain.   Functional changes noted by patient: Improvement in Self Care Tasks (dressing, eating, bathing, hygiene/toileting), Household Chores and use of adaptive tools  Response to previous treatment:  good  Patient has noted adverse reaction to:   None    Occupational Profile Information:  Current occupation is "[employer]"  Currently working in normal job without restrictions  Job Tasks: Computer Work, Driving, Prolonged Sitting, Prolonged Standing, Repetitive Tasks  Prior functional level:  no limitations  Barriers include:none  Mobility: No difficulty  Transportation: drives  Leisure activities/hobbies: eating, did enjoy basketball and mountain biking, fishing, boating, out to dinner, puzzles, traveling, likes to work out, does this in the shower  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[employer], 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, to move the furniture,   Notes need to work conservation skills as she tends to work and then be too fatigued rest of the day;  Writing issues, overhead tasks can be fatiguing    Functional Outcome Measure:  See flowsheet    Objective:      Pain Level (Scale 0-10):   8/4/2020 3/4/2021     At Rest 2-3 to 4 1-2   With Use 9 3-4     Pain Description:  Date 8/4/2020 3/4/2021      bilateral     Location  elbow, wrist, hand and thumb    Pain Quality Aching, Exhausting, Miserable, Nagging, Numb, Penetrating, Sharp, Shooting,  Stabbing, Throbbing, Tingling and Tiring Less numbness and tingling, and is not having the awful pain as before, per pt   Frequency intermittent or daily      Pain is worst Daytime, can awake her at night    Exacerbated by Use during the day    Relieved by heat (has a hot tub and hot showers) and rest    Progression Not getting better Much improved     Observation and Pt Comments/Goals: opening jars, writing, wrist and hand and thumb strength, and adaptive tools   2/11/2021  Notes from Virtual Visit: Pt note difficulty holding and pouring  coffee pot , lifting pans, manipulating heavier things. And getting lids of jars off, groceries being put away, pt notes she works fast and furious, voices difficulty accepting the difficulties with strength and endurance.   Education in this session:  Pt was  in kitchen and asked to show how she uses kitchen items and to problem solve to reduce hand and wrist and body fatigue/discomfort:   Cutting edu on posture? edu on posture for keeping back in line and shoulder blades down when reaching, keeping items close to body and not reaching far, use of both hands,   Cutting: use of roller knives/cutters and keeping wrists in neutral  Carrying heaving using both hands.  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 Found resource and discussed use of massage tools to reduce hand and finger work and use larger joints and larger muscles to perform this with partner.       Assessment:  Response to therapy has been improvement to:  Pain:  frequency is less, intensity of pain is decreased, duration of pain is decreased and less tender over affected area  Self Care Skills:  Using adaptive techniques and tools to reduce pain in household and work tasks, has the knowledge base of tools to search out to cont to reduce body effort and pain    Overall Assessment:  Patient is becoming more independent in home exercise program  STG/LTG:  STGoals have been reviewed and progress or achievement has occurred;  see goal sheet for details and updates.  LTGoals have been reviewed and progress or achievement has occurred:  see goal sheet for details and updates.  I have re-evaluated this patient and find that the nature, scope, duration and intensity of the therapy is appropriate for the medical condition of the patient.          Plan:  Discharge to home program.    Orthotic Fabrication:   Therapeutic Exercise:  Tendon Gliding, Isometrics and Stabilization  Neuromuscular re-education:  Kinesthetic Training, Proprioceptive Training, Kinesiotaping and Stabilization  Manual Techniques:  Myofascial release  Self Care:  Ergonomic Considerations; joint protection  Orthotic Fabrication: Static forearm based orthosis bilateral; Static CMC stabilization orthosis BUE    Discharge Plan:  Achieve all LTG.  Independent in home treatment program.  Reach maximal therapeutic benefit.      Home Exercise Program:  Wear wrist splints at night and as needed for daytime tasks  8/24/2020  Exercise Name: Orthosis Wear and Care, Notes: Wear your splint to support your hand  "and remove for showers and for exercises 3-5x/day, wear at night for sure, and for resting  Use your hand for light activities: eating, dressing, showering, No heavy use of your hand.  Clean with mild soap and water. Keep out of heated car or trunk, or other heat source.  Exercise Name: TENNIS ELBOW PREVENTION  Exercise Name: Cubital Tunnel Prevention, Notes: Consider a lapdesk for keyboard (separate) and wear the elbow padded sleeve at night  Exercise Name: Forearm Passive Range of Motion Extensor Stretch, Sets: 1 set - Reps: 3 reps, Notes: Start with your elbow at your side, then gradually increase straightening of the elbow, minding the pain, don't push into the pain. I recommend the \"power\" of 3 repetitions for stretching.By the 3rd stretch the muscles will feel more loose.   At the end of the stretch, ANGLE your hand toward your same hip. Hold each stretch for 15-30 seconds each.      Exercise Name: Forearm PROM Advanced Flexor Stretch, Sets: 1 set - Reps: 3 reps - Sessions: 2-3x/day;, Notes: KEEP YOUR SHOULDER RELAXED AND DOWN. Start with elbow bent   I recommend the \"power\" of 3 repetitions for stretching.By the 3rd stretch the muscles will feel more loose.   Hold each stretch for 15-30 seconds each.   Exercise Name: Ball Massage  Exercise Name: Ball Massage to Extensors, Notes: USE doggy (small) or a tennis ball over muscles, or ask for assistance from \"loved one\"  to do this.  Find the tender spot and hold and do small circles over that spot until it is less tender.  Exercise Name: Ball Massage to Flexors - Sessions: 1-2 x / day, Notes: Stay in the muscle belly region. USE a hard, rubber doggy (small) or a other ball to massage the muscles, or ask for assistance from \"loved one\"  Exercise Name: Nerve Gliding Distal Ulnar , Sets: 1 - Reps: 8 - Sessions: 2-3, Notes:  Stand at  posture, exercise both arms, one at a time;  don't push into \"buzzing\" of the nerve, just a mild traction, just to " tension  Exercise Name: Nerve Gliding Proximal Radial, Sets: 3 - Reps: 3 - Sessions: 3, Notes: Waiters Tip: You can do this sitting or standing  tilt head toward R side, as you are gliding the arm/nerve, DO NOT TILT HEAD AWAY  12/3/2020  Wear BUE CMC stabilization splint  BUE webspace release with clip  3/4/2021  Wrist isometrics, all planes  Will use adaptive tech and tools to reduce pain

## 2021-04-18 ENCOUNTER — HEALTH MAINTENANCE LETTER (OUTPATIENT)
Age: 41
End: 2021-04-18

## 2021-10-02 ENCOUNTER — HEALTH MAINTENANCE LETTER (OUTPATIENT)
Age: 41
End: 2021-10-02

## 2022-04-14 ENCOUNTER — TRANSCRIBE ORDERS (OUTPATIENT)
Dept: OTHER | Age: 42
End: 2022-04-14

## 2022-04-14 DIAGNOSIS — Q79.60 EDS (EHLERS-DANLOS SYNDROME): Primary | ICD-10-CM

## 2022-05-14 ENCOUNTER — HEALTH MAINTENANCE LETTER (OUTPATIENT)
Age: 42
End: 2022-05-14

## 2022-06-03 ENCOUNTER — HOSPITAL ENCOUNTER (OUTPATIENT)
Dept: PHYSICAL THERAPY | Facility: CLINIC | Age: 42
Setting detail: THERAPIES SERIES
Discharge: HOME | End: 2022-06-03
Attending: FAMILY MEDICINE
Payer: COMMERCIAL

## 2022-06-03 PROCEDURE — 97535 SELF CARE MNGMENT TRAINING: CPT | Mod: GP | Performed by: PHYSICAL THERAPIST

## 2022-06-03 PROCEDURE — 97110 THERAPEUTIC EXERCISES: CPT | Mod: GP | Performed by: PHYSICAL THERAPIST

## 2022-06-03 PROCEDURE — 97162 PT EVAL MOD COMPLEX 30 MIN: CPT | Mod: GP | Performed by: PHYSICAL THERAPIST

## 2022-06-06 NOTE — PROGRESS NOTES
06/03/22 1300   Quick Adds   Type of Visit Initial OP PT Evaluation   General Information   Start of Care Date 06/03/22   Referring Physician Marium Antony MD   Orders Evaluate and Treat as Indicated   Order Date 04/14/22   Medical Diagnosis EDS   Onset of illness/injury or Date of Surgery 04/14/22   Surgical/Medical history reviewed Yes   Pertinent history of current problem (include personal factors and/or comorbidities that impact the POC) Patient with PMH of hypermoility type EDS. H/o 6 R ankle surgeries, bone on bone. Reports since working with Evelyn she has been doing strengthening exercises. Doing free weights. and leg strengthening. has more muscle back in her legs. My neck and lower back are giving me a hard time. has had whiplash in 2010 so has a vertebra that doesn't curve how it should. Sometimes getting headaches. A couple a week. Mostly concerned with neck and back pain. Difficult time staging homes with .   Previous/Current Treatment Physical Therapy   Improvement after PT Significant  (Last episode of care ended 2020)   Current Community Support Family/friend caregiver  (lives with )   Patient role/Employment history Employed  (relator with )   Living environment House[facility]   Home/Community Accessibility Comments One level living , when staging and going up/down stairs I hurt a lot more.   Assistive Devices Comments Has chair she could put in the shower, currently sitting on the floor   Patient/Family Goals Statement To reduce neck and back pain, move better, sleep better   General Information Comments sleeping is hard for me, plane rides difficult - has neck support pillow she uses. Also uses pillow to support BUEs   Fall Risk Screen   Fall screen completed by PT   Have you fallen 2 or more times in the past year? No   Have you fallen and had an injury in the past year? Yes   Timed Up and Go score (seconds) Not tested this session   Is patient a fall risk? Yes   Fall  screen comments Pt reports falling in the shower about a jessica ago and hitting her head. Thinks she suffered a concussion but only lasted a few days.   Pain   Patient currently in pain Yes   Pain location neck and low back, L shoulder   Pain rating 5-8 range of pain   Pain comments neck: constantly aching, holding stress there. Cant find a comforbale way to sleep. Low back:twinges and gets stuck, nerve pain, constant aching. Goal 1-2/10   Cognitive Status Examination   Orientation orientation to person, place and time   Level of Consciousness alert   Follows Commands and Answers Questions 100% of the time   Personal Safety and Judgment intact   Memory intact   Integumentary   Integumentary No deficits were identified   Posture   Posture Comments Seated: pt often demo's increased lumbar lordosis and crossess legs around eachother twice. Toes only touching the ground.   Strength   Strength Comments [facility]   Bed Mobility   Bed Mobility Comments sleeps on R side (L shoulder is painful) straightens out arms.   Transfer Skills   Transfer Comments Independent   Balance   Balance Comments Not formally assessed, pt reports she does not have the best balance, but is not too concerned about it.   Modality Interventions   Planned Modality Interventions Comments Per therapist discretion   Planned Therapy Interventions   Planned Therapy Interventions balance training;gait training;neuromuscular re-education;orthotic fitting/training;ROM;strengthening;stretching;transfer training;manual therapy   Clinical Impression   Criteria for Skilled Therapeutic Interventions Met yes, treatment indicated   PT Diagnosis Neck/back pain   Influenced by the following impairments Hypermobility, pain, weakness, imbalance, reduced activity tolerance   Functional limitations due to impairments stair climbing, staging for work, showering, sleeping   Clinical Presentation Stable/Uncomplicated   Clinical Decision Making (Complexity) Low complexity    Therapy Frequency 1 time/week   Predicted Duration of Therapy Intervention (days/wks) Up to 90 days   Risk & Benefits of therapy have been explained Yes   Patient, Family & other staff in agreement with plan of care Yes   Clinical Impression Comments Patient with EDS comes back to PT after ending episode of care in 2020 with reports of new low back and neck pain. She will benefit from skilled PT services in order to improve pain, strength/stability and activity tolerance in order to improve ability to work, perform stairs and travel.   GOALS   PT Eval Goals 1;2;3;4   Goal 1   Goal Identifier Neck pain   Goal Description Pt to report improved neck pain no greater than 2/10 in order to reduce headaches and improve sleep.   Target Date 08/31/22   Goal 2   Goal Identifier Staging   Goal Description Pt to report the ability to   Goal 3   Goal Identifier Back pain   Goal Description Pt to report back pain no greater than 2/10 as a result of improved core and hip strength and compensation strategies in order to tolerate 3 hour airplane flights and perform stairs at houses.   Target Date 08/31/22   Goal 4   Goal Identifier Sleeping   Goal Description Pt to report the ability to sleep for 5+ hours w/o waking due to pain as a result of use of new pillows and positioning techniques.   Target Date 08/31/22   Total Evaluation Time   PT Inder, Moderate Complexity Minutes ([id number]) 25

## 2022-06-17 ENCOUNTER — HOSPITAL ENCOUNTER (OUTPATIENT)
Dept: PHYSICAL THERAPY | Facility: CLINIC | Age: 42
Setting detail: THERAPIES SERIES
Discharge: HOME | End: 2022-06-17
Attending: FAMILY MEDICINE
Payer: COMMERCIAL

## 2022-06-17 PROCEDURE — 97110 THERAPEUTIC EXERCISES: CPT | Mod: GP | Performed by: PHYSICAL THERAPIST

## 2022-06-24 ENCOUNTER — HOSPITAL ENCOUNTER (OUTPATIENT)
Dept: PHYSICAL THERAPY | Facility: CLINIC | Age: 42
Setting detail: THERAPIES SERIES
Discharge: HOME | End: 2022-06-24
Attending: FAMILY MEDICINE
Payer: COMMERCIAL

## 2022-06-24 PROCEDURE — 97110 THERAPEUTIC EXERCISES: CPT | Mod: GP | Performed by: PHYSICAL THERAPIST

## 2022-06-24 PROCEDURE — 97140 MANUAL THERAPY 1/> REGIONS: CPT | Mod: GP | Performed by: PHYSICAL THERAPIST

## 2022-07-27 ENCOUNTER — TELEPHONE (OUTPATIENT)
Dept: PHYSICAL THERAPY | Facility: CLINIC | Age: 42
End: 2022-07-27

## 2022-09-02 ENCOUNTER — HOSPITAL ENCOUNTER (OUTPATIENT)
Dept: PHYSICAL THERAPY | Facility: CLINIC | Age: 42
Setting detail: THERAPIES SERIES
Discharge: HOME | End: 2022-09-02
Attending: FAMILY MEDICINE
Payer: COMMERCIAL

## 2022-09-02 PROCEDURE — 97110 THERAPEUTIC EXERCISES: CPT | Mod: GP | Performed by: PHYSICAL THERAPIST

## 2022-09-02 NOTE — DISCHARGE INSTRUCTIONS
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

## 2022-09-03 ENCOUNTER — HEALTH MAINTENANCE LETTER (OUTPATIENT)
Age: 42
End: 2022-09-03

## 2022-10-07 ENCOUNTER — HOSPITAL ENCOUNTER (OUTPATIENT)
Dept: PHYSICAL THERAPY | Facility: CLINIC | Age: 42
Setting detail: THERAPIES SERIES
Discharge: HOME | End: 2022-10-07
Attending: FAMILY MEDICINE
Payer: COMMERCIAL

## 2022-10-07 PROCEDURE — 97530 THERAPEUTIC ACTIVITIES: CPT | Mod: GP | Performed by: PHYSICAL THERAPIST

## 2022-10-12 ENCOUNTER — HOSPITAL ENCOUNTER (OUTPATIENT)
Dept: PHYSICAL THERAPY | Facility: CLINIC | Age: 42
Setting detail: THERAPIES SERIES
Discharge: HOME | End: 2022-10-12
Attending: FAMILY MEDICINE
Payer: COMMERCIAL

## 2022-10-12 PROCEDURE — 97110 THERAPEUTIC EXERCISES: CPT | Mod: GP | Performed by: PHYSICAL THERAPIST

## 2022-10-12 NOTE — DISCHARGE INSTRUCTIONS
10/12/22   - Perform PT exercises in your home gym  - Sit on the edge of your bench and use the mirror to check your form   - Wear wrist braces when performing upper body exercises.   - GOAL: 11pm bedtime: wake up at 6-6:30am   - Set at time in your day for your PT exercises (approx 30 minutes)   - Leg press: use blue band with hoops for hands: activate core and slowly perform through full range of motion. Use chair for support if your low back hurts.

## 2022-10-28 ENCOUNTER — HOSPITAL ENCOUNTER (OUTPATIENT)
Dept: PHYSICAL THERAPY | Facility: CLINIC | Age: 42
Setting detail: THERAPIES SERIES
Discharge: HOME | End: 2022-10-28
Attending: FAMILY MEDICINE
Payer: COMMERCIAL

## 2022-10-28 PROCEDURE — 97110 THERAPEUTIC EXERCISES: CPT | Mod: GP | Performed by: PHYSICAL THERAPIST

## 2022-11-14 ENCOUNTER — HOSPITAL ENCOUNTER (OUTPATIENT)
Dept: PHYSICAL THERAPY | Facility: CLINIC | Age: 42
Setting detail: THERAPIES SERIES
Discharge: HOME | End: 2022-11-14
Attending: FAMILY MEDICINE
Payer: COMMERCIAL

## 2022-11-14 PROCEDURE — 97110 THERAPEUTIC EXERCISES: CPT | Mod: GP | Performed by: PHYSICAL THERAPIST

## 2022-11-21 ENCOUNTER — HOSPITAL ENCOUNTER (OUTPATIENT)
Dept: PHYSICAL THERAPY | Facility: CLINIC | Age: 42
Setting detail: THERAPIES SERIES
Discharge: HOME | End: 2022-11-21
Attending: FAMILY MEDICINE
Payer: COMMERCIAL

## 2022-11-21 PROCEDURE — 97110 THERAPEUTIC EXERCISES: CPT | Mod: GP | Performed by: PHYSICAL THERAPIST

## 2022-12-07 ENCOUNTER — HOSPITAL ENCOUNTER (OUTPATIENT)
Dept: PHYSICAL THERAPY | Facility: CLINIC | Age: 42
Setting detail: THERAPIES SERIES
Discharge: HOME | End: 2022-12-07
Attending: FAMILY MEDICINE
Payer: COMMERCIAL

## 2022-12-07 PROCEDURE — 97110 THERAPEUTIC EXERCISES: CPT | Mod: GP | Performed by: PHYSICAL THERAPIST

## 2022-12-16 ENCOUNTER — HOSPITAL ENCOUNTER (OUTPATIENT)
Dept: PHYSICAL THERAPY | Facility: CLINIC | Age: 42
Setting detail: THERAPIES SERIES
Discharge: HOME | End: 2022-12-16
Attending: FAMILY MEDICINE
Payer: COMMERCIAL

## 2022-12-16 PROCEDURE — 97140 MANUAL THERAPY 1/> REGIONS: CPT | Mod: GP | Performed by: PHYSICAL THERAPIST

## 2022-12-16 PROCEDURE — 97110 THERAPEUTIC EXERCISES: CPT | Mod: GP | Performed by: PHYSICAL THERAPIST

## 2022-12-16 NOTE — DISCHARGE INSTRUCTIONS
12/16/22   - Leg press lying on back with yellow band, begin with 60 seconds only each side. Increase by 10 seconds only every other day until you reach to 3 minutes.   - Use pillow with rolled up towel for neck support when you are exercising. Use dog bed or towel for forehead when performing exercises on your stomach. Add to pillow on bed for night sleep as well.   - Performed seated muscle energy technique each morning OR if you feel a spike in back pain and before putting your SI belt on.   - Lying on back, both legs up in air hip/knees at 90deg: alternate heel taps to the floor with core activation x 60 seconds.

## 2023-01-09 ENCOUNTER — HOSPITAL ENCOUNTER (OUTPATIENT)
Dept: PHYSICAL THERAPY | Facility: CLINIC | Age: 43
Setting detail: THERAPIES SERIES
Discharge: HOME | End: 2023-01-09
Attending: FAMILY MEDICINE
Payer: COMMERCIAL

## 2023-01-09 PROCEDURE — 97110 THERAPEUTIC EXERCISES: CPT | Mod: GP | Performed by: PHYSICAL THERAPIST

## 2023-01-09 NOTE — DISCHARGE INSTRUCTIONS
01/09/2023  - Use theracane for neck and back - release tight muscles  - Use SI belt for cleaning and any activity that you are on your feet for extended amounts of time.   - Continue home program as normal until next PT session.   - Try to find new shoes now, size 9.5 with wedge option

## 2023-01-16 ENCOUNTER — TRANSFERRED RECORDS (OUTPATIENT)
Dept: PHYSICAL THERAPY | Facility: CLINIC | Age: 43
End: 2023-01-16

## 2023-01-27 ENCOUNTER — HOSPITAL ENCOUNTER (OUTPATIENT)
Dept: PHYSICAL THERAPY | Facility: CLINIC | Age: 43
Setting detail: THERAPIES SERIES
Discharge: HOME | End: 2023-01-27
Attending: FAMILY MEDICINE
Payer: COMMERCIAL

## 2023-01-27 ENCOUNTER — TRANSFERRED RECORDS (OUTPATIENT)
Dept: HEALTH INFORMATION MANAGEMENT | Facility: CLINIC | Age: 43
End: 2023-01-27

## 2023-01-27 PROCEDURE — 97535 SELF CARE MNGMENT TRAINING: CPT | Mod: GP | Performed by: PHYSICAL THERAPIST

## 2023-02-09 ENCOUNTER — TRANSFERRED RECORDS (OUTPATIENT)
Dept: HEALTH INFORMATION MANAGEMENT | Facility: CLINIC | Age: 43
End: 2023-02-09

## 2023-02-14 ENCOUNTER — TRANSFERRED RECORDS (OUTPATIENT)
Dept: HEALTH INFORMATION MANAGEMENT | Facility: CLINIC | Age: 43
End: 2023-02-14

## 2023-02-20 ENCOUNTER — HOSPITAL ENCOUNTER (OUTPATIENT)
Dept: PHYSICAL THERAPY | Facility: CLINIC | Age: 43
Setting detail: THERAPIES SERIES
Discharge: HOME | End: 2023-02-20
Attending: FAMILY MEDICINE
Payer: COMMERCIAL

## 2023-02-20 PROCEDURE — 97110 THERAPEUTIC EXERCISES: CPT | Mod: GP | Performed by: PHYSICAL THERAPIST

## 2023-02-20 NOTE — DISCHARGE INSTRUCTIONS
02/20/23  PT exercises: split up exercises   1) lying down core, bicep curl, lying on stomach glut strengthening, arm raises, lying down leg press   2) Bridge w/heel lift, chicken dance, hip abduction w/black band, shoulder external rotation, SLR   ** every day - supported chin tuck **     - Use neck brace on the plane for 30-45 min increments.   - Try to find new primary care provider that knows EDS

## 2023-03-10 ENCOUNTER — HOSPITAL ENCOUNTER (OUTPATIENT)
Dept: PHYSICAL THERAPY | Facility: CLINIC | Age: 43
Setting detail: THERAPIES SERIES
Discharge: HOME | End: 2023-03-10
Attending: FAMILY MEDICINE
Payer: COMMERCIAL

## 2023-03-10 PROCEDURE — 97110 THERAPEUTIC EXERCISES: CPT | Mod: GP | Performed by: PHYSICAL THERAPIST

## 2023-03-24 ENCOUNTER — HOSPITAL ENCOUNTER (OUTPATIENT)
Dept: PHYSICAL THERAPY | Facility: CLINIC | Age: 43
Setting detail: THERAPIES SERIES
Discharge: HOME | End: 2023-03-24
Attending: FAMILY MEDICINE
Payer: COMMERCIAL

## 2023-03-24 PROCEDURE — 97110 THERAPEUTIC EXERCISES: CPT | Mod: GP | Performed by: PHYSICAL THERAPIST

## 2023-03-24 NOTE — DISCHARGE INSTRUCTIONS
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

## 2023-04-21 ENCOUNTER — HOSPITAL ENCOUNTER (OUTPATIENT)
Dept: PHYSICAL THERAPY | Facility: CLINIC | Age: 43
Setting detail: THERAPIES SERIES
Discharge: HOME | End: 2023-04-21
Attending: FAMILY MEDICINE
Payer: COMMERCIAL

## 2023-04-21 PROCEDURE — 97140 MANUAL THERAPY 1/> REGIONS: CPT | Mod: GP | Performed by: PHYSICAL THERAPIST

## 2023-04-21 PROCEDURE — 97535 SELF CARE MNGMENT TRAINING: CPT | Mod: GP | Performed by: PHYSICAL THERAPIST

## 2023-05-08 ENCOUNTER — HOSPITAL ENCOUNTER (OUTPATIENT)
Dept: PHYSICAL THERAPY | Facility: CLINIC | Age: 43
Setting detail: THERAPIES SERIES
Discharge: HOME | End: 2023-05-08
Attending: FAMILY MEDICINE
Payer: COMMERCIAL

## 2023-05-08 PROCEDURE — 97535 SELF CARE MNGMENT TRAINING: CPT | Mod: GP | Performed by: PHYSICAL THERAPIST

## 2023-05-08 PROCEDURE — 97110 THERAPEUTIC EXERCISES: CPT | Mod: GP | Performed by: PHYSICAL THERAPIST

## 2023-05-22 ENCOUNTER — THERAPY VISIT (OUTPATIENT)
Dept: PHYSICAL THERAPY | Facility: CLINIC | Age: 43
End: 2023-05-22
Attending: FAMILY MEDICINE
Payer: COMMERCIAL

## 2023-05-22 DIAGNOSIS — Q79.60 EHLERS-DANLOS SYNDROME: Primary | ICD-10-CM

## 2023-05-22 PROCEDURE — 97110 THERAPEUTIC EXERCISES: CPT | Mod: GP | Performed by: PHYSICAL THERAPIST

## 2023-06-03 ENCOUNTER — HEALTH MAINTENANCE LETTER (OUTPATIENT)
Age: 43
End: 2023-06-03

## 2023-06-30 ENCOUNTER — THERAPY VISIT (OUTPATIENT)
Dept: PHYSICAL THERAPY | Facility: CLINIC | Age: 43
End: 2023-06-30
Attending: FAMILY MEDICINE
Payer: COMMERCIAL

## 2023-06-30 DIAGNOSIS — Q79.60 EHLERS-DANLOS SYNDROME: Primary | ICD-10-CM

## 2023-06-30 PROCEDURE — 97110 THERAPEUTIC EXERCISES: CPT | Mod: GP | Performed by: PHYSICAL THERAPIST

## 2023-06-30 PROCEDURE — 97535 SELF CARE MNGMENT TRAINING: CPT | Mod: GP | Performed by: PHYSICAL THERAPIST

## 2023-06-30 NOTE — PROGRESS NOTES
06/30/23 0500   Appointment Info   Signing clinician's name / credentials Adriana Badillo DPT   Visits Used 20 - [employer] open access   PT Tx Diagnosis Neck/back pain   Progress Note/Certification   Predicted Duration Up to 90 days   PT Goal 1   Goal Identifier Neck pain   Goal Description Pt to report improved neck pain no greater than 2/10 in order to reduce headaches and improve sleep.   Goal Progress 6/30/23 - Don't have a ton of neck pain anymore. Prolotherapy every 4-6 weeks. I haven't woken up dizzy or light headed. 11/21/22 - neck pain is about the same. Forget the neck exercises sometimes. 9/2/22 - HAs have  been fine, neck pain present but not bad.   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 At the end of the day is pooped, but better with AFOs. 11/21/22 - Staging has been going okay. Need to stage the Spirit[employer] this week. Also have Thanksgiving. 9/2/22 - not using techniques for staging yet. Will try again.   Target Date 07/19/23   PT Goal 3   Goal Identifier Back pain   Goal Description Pt to report back pain no greater than 2/10 as a result of improved core and hip strength and compensation strategies in order to tolerate 3 hour airplane flights and perform stairs at houses.   Goal Progress 6/30/23 - still getting prolotherapy for back, is stronger though. Feels like shes on the verge of tweaking it but it feels fine. No constant pain. Flying: brings back support, uses neck collar. When in first. 1-2/10. 11/21/22 - 3-4/10. 9/2/22 - 6-7/10   Target Date 07/19/23   Date Met 06/30/23   PT Goal 4   Goal Identifier Sleeping   Goal Description Pt to report the ability to sleep for 5+ hours w/o waking due to pain as a result of use of new pillows and  positioning techniques.   Goal Progress 6/30/23 - sleeping is better. Getting 5 hour stretches more so than not. 11/21/22 - Reports not waking up d/t taking gummies. 9/2/22 - waking up with pain   Target Date 07/19/23   Subjective Report   Subjective Report I feel like my neck is getting more stable with treatments in [place]. Had EMGs in arms and legs. No neuropathy. Getting hornome testing done as well. Flare up for the pain is getting better. Getting up off the floor has been a little harder. My core still feels a little weak.   Objective Measure 3   Details : right: 79,80,80  left: 78,65,60; pinch: right: 8,9,8lbs left: 8, 6, 5   Objective Measure girp and pinch strength   Therapeutic Procedure/Exercise   Therapeutic Procedures: strength, endurance, ROM, flexibillity minutes (56738) 25   Ther Proc 2 Strength HEP   Ther Proc 2 - Details Added seated df with blue band x 5 reps. Okay to try supine leg press as long as it does not increase back pain.   Ther Proc 1  and pinch strength   Ther Proc 1 - Details Pt reports increased weakness at hands so PT assessed strength with the following results: right : 80lbs, left : 68lbs; right pinch: 8.5lbs, left : 6.3lbs.  is lower end of normal for women her age and pinch is below normal for women her age.   Self Care/home Management   ADL/Home Mgmt Training (82132) 20   Skilled Intervention Symptom mangement   Patient Response/Progress Engaged in conversation   Treatment Detail Education about continued work at home to reduce pain and improve stability: Should be wearing AFOs as much as possible, especially when staging. Should also be using her wrist and finger splints when staging and carrying things. No diff than doing the PT exercises: pt agreed and will try at home. Will return to hand therapy if feels she needs it but will try to resume exercises - PT printed them off for her to resume.   Education   Learner/Method Patient   Education Comments above    Plan   Home program HEP:TA set, alt UE/LE marches, bridging w/marching, SLR, hip abd sidelying, hip adduction sidelying, clamshells YB . UE: scaption w/2lbs, chicken dance, bicep curl, shld ER w/RB seated   Updates to plan of care added: seated gastroc w/green band, supine leg press   Plan for next session f/u on AFO use and wrist/finger support use, progress exercises, nerve glides? check goals.   Total Session Time   Timed Code Treatment Minutes 45   Total Treatment Time (sum of timed and untimed services) 45         PLAN  Continue therapy per current plan of care.    Beginning/End Dates of Progress Note Reporting Period:   12/7/22to 06/30/2023    Referring Provider:  Marium Antony

## 2023-07-06 ENCOUNTER — TRANSCRIBE ORDERS (OUTPATIENT)
Dept: OTHER | Age: 43
End: 2023-07-06

## 2023-07-06 DIAGNOSIS — Q79.60 EHLERS-DANLOS SYNDROME: Primary | ICD-10-CM

## 2023-08-04 ENCOUNTER — THERAPY VISIT (OUTPATIENT)
Dept: PHYSICAL THERAPY | Facility: CLINIC | Age: 43
End: 2023-08-04
Attending: FAMILY MEDICINE
Payer: COMMERCIAL

## 2023-08-04 DIAGNOSIS — Q79.60 EHLERS-DANLOS SYNDROME: Primary | ICD-10-CM

## 2023-08-04 PROCEDURE — 97530 THERAPEUTIC ACTIVITIES: CPT | Mod: GP | Performed by: PHYSICAL THERAPIST

## 2023-08-04 PROCEDURE — 97110 THERAPEUTIC EXERCISES: CPT | Mod: GP | Performed by: PHYSICAL THERAPIST

## 2023-08-25 ENCOUNTER — THERAPY VISIT (OUTPATIENT)
Dept: PHYSICAL THERAPY | Facility: CLINIC | Age: 43
End: 2023-08-25
Attending: FAMILY MEDICINE
Payer: COMMERCIAL

## 2023-08-25 DIAGNOSIS — Q79.60 EHLERS-DANLOS SYNDROME: Primary | ICD-10-CM

## 2023-08-25 PROCEDURE — 97110 THERAPEUTIC EXERCISES: CPT | Mod: GP | Performed by: PHYSICAL THERAPIST

## 2023-08-30 ENCOUNTER — TRANSCRIBE ORDERS (OUTPATIENT)
Dept: OTHER | Age: 43
End: 2023-08-30

## 2023-08-30 DIAGNOSIS — Q79.62 HYPERMOBILE EHLERS-DANLOS SYNDROME: Primary | ICD-10-CM

## 2023-09-11 ENCOUNTER — THERAPY VISIT (OUTPATIENT)
Dept: PHYSICAL THERAPY | Facility: CLINIC | Age: 43
End: 2023-09-11
Attending: FAMILY MEDICINE
Payer: COMMERCIAL

## 2023-09-11 DIAGNOSIS — Q79.60 EHLERS-DANLOS SYNDROME: Primary | ICD-10-CM

## 2023-09-11 PROCEDURE — 97110 THERAPEUTIC EXERCISES: CPT | Mod: GP | Performed by: PHYSICAL THERAPIST

## 2023-09-19 ENCOUNTER — THERAPY VISIT (OUTPATIENT)
Dept: OCCUPATIONAL THERAPY | Facility: CLINIC | Age: 43
End: 2023-09-19
Payer: COMMERCIAL

## 2023-09-19 DIAGNOSIS — Q79.62 HYPERMOBILE EHLERS-DANLOS SYNDROME: Primary | ICD-10-CM

## 2023-09-19 PROBLEM — M79.644 BILATERAL THUMB PAIN: Status: ACTIVE | Noted: 2020-08-04

## 2023-09-19 PROBLEM — M79.645 BILATERAL THUMB PAIN: Status: ACTIVE | Noted: 2020-08-04

## 2023-09-19 PROCEDURE — 97165 OT EVAL LOW COMPLEX 30 MIN: CPT | Mod: GO | Performed by: OCCUPATIONAL THERAPIST

## 2023-09-19 PROCEDURE — 97110 THERAPEUTIC EXERCISES: CPT | Mod: GO | Performed by: OCCUPATIONAL THERAPIST

## 2023-09-19 NOTE — PROGRESS NOTES
OCCUPATIONAL THERAPY EVALUATION  Type of Visit: Evaluation    See electronic medical record for Abuse and Falls Screening details.    Subjective   Presenting condition or subjective complaint:  Dalton Danlos Syndrome; bilateral hand pain; bilateral wrist pain; bilateral elbow pain  Date of onset: Chronic (Order date: 08/30/2023)    Relevant medical history:  Past Medical History:   Diagnosis Date    Anxiety and depression     Arthritis     foot    Cancer of lip     CYP2D6 deficiency (H)     Dalton-Danlos syndrome     POSSIBLE    Irregular menstrual bleeding     Pelvic cramping      Dates & types of surgery:  Past Surgical History:   Procedure Laterality Date    DILATION AND CURETTAGE, OPERATIVE HYSTEROSCOPY WITH MORCELLATOR, COMBINED N/A 10/4/2019    Procedure: HYSTEROSCOPY, WITH DILATION AND CURETTAGE OF UTERUS,  POLYPECTOMY USING GRIDER AND NEPHAssay Depot MORCELLATOR;  Surgeon: Nelia Claudio MD;  Location:  OR    ENT SURGERY      T & A    GYN SURGERY      2011 laparoscopy- Left Tubal Cyst    LAPAROSCOPIC ABLATION ENDOMETRIOSIS  10/4/2019    Procedure: DIAGNOSTIC LAPAROSCOPY WITH left salpingectomy;  Surgeon: Nelia Claudio MD;  Location:  OR    ORTHOPEDIC SURGERY      FOOT SURGERY- 6 LEFT, 1 RIGHT    SOFT TISSUE SURGERY      MOH'S SURGERY- LIP     Prior diagnostic imaging/testing results: See imaging reports in EMR  Prior therapy history for the same diagnosis, illness or injury: Hand therapy, has wrist orthoses and finger orthoses which she wears intermittently    Prior level of function:  Transfers: Independent  Ambulation: Independent  ADL: Independent  IADL: Independent    Living environment: Patient is independent at home and there are no concerns about accessibility and mobility in the home.    Employment: Realtor, some staging of homes, computer work, writing  Hobbies/Interests: Fishing     Patient goals for therapy: To improve wrist and hand strength for daily activities.       Objective   Right hand  dominant  Patient reports symptoms of pain and weakness/loss of strength    Pain Level (Scale 0-10)   9/19/2023   At best 3/10   At worst 9/10     Pain Description  Date 9/19/2023   Location Fingers, thumbs (MP joints), wrists   Pain Quality Aching   Frequency intermittent     Pain is worst  daytime   Exacerbated by  Gripping, pinching, writing, cutting meat   Relieved by Rest   Progression Gradually worsening     [place]on Hypermobility Scale   9/19/2023   B elbow (2) 2   B knees (2) 2   B thumbs (2) 1   B small finger HE (2) 2   Bend and touch floor w/flat palms (1) 1   Score: (0-9) 8   * Per report from 08/04/2020    Edema  None noted on exam.    Sensation   Patient reports mild tingling of fingertips.    ROM  Wrist 9/19/2023   AROM (PROM)    Extension R: 65  L: 75   Flexion R: 76  L: 73   RD R: 27  L: 25   UD R: 45  L: 35     Fingers  Patient is able to fully extend fingers and make a full composite fist. Joints of fingers and thumb noted to be hypermobile.    Hyperextension noted per joint:  Present = +/ Absent = -  9/19/2023  Hyperextension PIP DIP   Index R: +  L: + R: +  L: +   MF R: +  L: + R: +  L: +   RF R: +  L: + R: +  L: +   SF R: +  L: + R: +  L: +   Thumb IP R: +  L: +     Thumb MP R: +  L: +       Strength   (Measured in pounds)  Pain Report: - none  + mild    ++ moderate    +++ severe    9/19/2023 9/19/2023   Trials Left Right   1  2  3 50  47  35   53  45  35   Average 44 44.3     Lat Pinch 9/19/2023 9/19/2023   Trials Left Right   1  2   13+, thumb  11   16+, thumb  13     Average 12 14.5     3 Pt Pinch 9/19/2023 9/19/2023   Trials Left Right   1  2 7+  7+ 8+  8+     Average 7 8     Assessment & Plan   CLINICAL IMPRESSIONS  Medical Diagnosis: Dalton Danlos Syndrome    Treatment Diagnosis: Bilateral elbow pain; bilateral wrist pain; bilateral hand pain    Impression/Assessment: Pt is a 43 year old female presenting to Occupational Therapy due to the above condition.  The following significant  findings have been identified: Impaired activity tolerance, Impaired strength, and Pain.  These identified deficits interfere with their ability to perform self care tasks, work tasks, recreational activities, household chores, driving , and meal planning and preparation as compared to previous level of function.   Patient's limitations or Problem List includes: Pain, Sensory disturbance, Hypermobility, Decreased , and Decreased pinch of the bilateral elbow, wrist, hand, and thumb which interferes with the patient's ability to perform Self Care Tasks (dressing, eating, bathing, hygiene/toileting), Work Tasks, Recreational Activities, Household Chores, and Driving  as compared to previous level of function.    Clinical Decision Making (Complexity):  Assessment of Occupational Performance: 5 or more Performance Deficits  Occupational Performance Limitations: bathing/showering, toileting, dressing, feeding, hygiene and grooming, driving and community mobility, home establishment and management, meal preparation and cleanup, shopping, work, and leisure activities  Clinical Decision Making (Complexity): Low complexity    PLAN OF CARE  Treatment Interventions:  Therapeutic Exercise:  Isotonics, Isometrics, and Stabilization  Neuromuscular re-education:  Nerve Gliding and Kinesiotaping  Manual Techniques:  Myofascial release  Orthotic Fabrication:  As indicated  Self Care:  Joint protection techniques and adaptive equipment education    Long Term Goals   OT Goal 1  Goal Identifier: Work  Goal Description: Patient able to write for 10 minutes with 2/10 hand pain or less.  Rationale: In order to maximize safety and independence with performance of self-care activities  Target Date: 11/19/23  OT Goal 2  Goal Identifier: IADL  Goal Description: Patient demonstrates a 5-lb improvement in  strength to facilitate performance of household chores.  Target Date: 12/19/23      Frequency of Treatment: 2x/month  Duration of  Treatment: 3 months     Recommended Referrals to Other Professionals:  N/A  Education Assessment: Learner/Method: Patient;No Barriers to Learning     Risks and benefits of evaluation/treatment have been explained.   Patient/Family/caregiver agrees with Plan of Care.     Evaluation Time:    OT Eval, Low Complexity Minutes (97445): 30      Signing Clinician: Jessica Clemons OT

## 2023-09-25 ENCOUNTER — THERAPY VISIT (OUTPATIENT)
Dept: PHYSICAL THERAPY | Facility: CLINIC | Age: 43
End: 2023-09-25
Attending: FAMILY MEDICINE
Payer: COMMERCIAL

## 2023-09-25 DIAGNOSIS — Q79.60 EHLERS-DANLOS SYNDROME: Primary | ICD-10-CM

## 2023-09-25 PROCEDURE — 97140 MANUAL THERAPY 1/> REGIONS: CPT | Mod: GP | Performed by: PHYSICAL THERAPIST

## 2023-10-02 ENCOUNTER — THERAPY VISIT (OUTPATIENT)
Dept: OCCUPATIONAL THERAPY | Facility: CLINIC | Age: 43
End: 2023-10-02
Payer: COMMERCIAL

## 2023-10-02 DIAGNOSIS — M79.645 BILATERAL THUMB PAIN: Primary | ICD-10-CM

## 2023-10-02 DIAGNOSIS — M79.644 BILATERAL THUMB PAIN: Primary | ICD-10-CM

## 2023-10-02 PROCEDURE — 97535 SELF CARE MNGMENT TRAINING: CPT | Mod: GO | Performed by: OCCUPATIONAL THERAPIST

## 2023-10-02 PROCEDURE — 97110 THERAPEUTIC EXERCISES: CPT | Mod: GO | Performed by: OCCUPATIONAL THERAPIST

## 2023-10-02 NOTE — PROGRESS NOTES
Hand Therapy information for 10/2/2023.  Please refer to the daily flowsheet for treatment today, total treatment time and time spent performing 1:1 timed codes.         Home Program:    Specific activities to address:  Writing, work tasks (Realtor)  There has been a fairly fast progression of weakness.  Keeps dropping things. Mainly weakness, sometimes some paresthesias    Pain management  Thumb web release with clip (prior therapy episode)  Followed by Dr Antony, and Neurology    Orthoses:  Has custom wrist splints, custom thumb CMC splints  L wrist Futuro compression brace  R wrist brace OTC with padding  B thumb low profile OTC brace    Adaptive aides / modifications/ Joint Protection:  Some prior instruction in JPE/energy conservation  SI belt, AFOs  Modifications for jars, use 2 hands, keep wrists neutral)  Massage tools, key turners, book earl/propping    Exercises / stabilization:  Hand therapy in 2020/2021: R UE nerve issues, had ganglion suzanne improved (non op)  Has  balls - hold on use  Uses bands for shoulders (PT)  Hold on gripping (resume isometrics when ganglion is less)    Home program:  10/2/2023  Wear OTC braces for staging, and during exercises for other UB joints.  For now - wear R wrist splint at night. Wear during the day if possible (OTC brace Futuro compression)  Modified exercises (keep ganglion calm). Working to address strength    Next Visit:  Monitor ganglion and pain. May want custom orhtosis to use during exercises with bands for shoulder etc. Assess effectiveness of HEP. Progress thumb stability exercises, add finger stability and caregul  strengthening exercises if/when appropriate.

## 2023-10-09 ENCOUNTER — THERAPY VISIT (OUTPATIENT)
Dept: PHYSICAL THERAPY | Facility: CLINIC | Age: 43
End: 2023-10-09
Attending: FAMILY MEDICINE
Payer: COMMERCIAL

## 2023-10-09 DIAGNOSIS — Q79.60 EHLERS-DANLOS SYNDROME: Primary | ICD-10-CM

## 2023-10-09 PROCEDURE — 97140 MANUAL THERAPY 1/> REGIONS: CPT | Mod: GP | Performed by: PHYSICAL THERAPIST

## 2023-10-15 ENCOUNTER — TRANSFERRED RECORDS (OUTPATIENT)
Dept: MULTI SPECIALTY CLINIC | Facility: CLINIC | Age: 43
End: 2023-10-15

## 2023-11-15 ENCOUNTER — THERAPY VISIT (OUTPATIENT)
Dept: OCCUPATIONAL THERAPY | Facility: CLINIC | Age: 43
End: 2023-11-15
Payer: COMMERCIAL

## 2023-11-15 DIAGNOSIS — M79.644 BILATERAL THUMB PAIN: Primary | ICD-10-CM

## 2023-11-15 DIAGNOSIS — M79.645 BILATERAL THUMB PAIN: Primary | ICD-10-CM

## 2023-11-15 PROCEDURE — 97110 THERAPEUTIC EXERCISES: CPT | Mod: GO | Performed by: OCCUPATIONAL THERAPIST

## 2023-11-15 PROCEDURE — 97535 SELF CARE MNGMENT TRAINING: CPT | Mod: GO | Performed by: OCCUPATIONAL THERAPIST

## 2023-11-15 NOTE — PROGRESS NOTES
SOAP note information for 11/15/2023.  Please refer to the daily flowsheet for treatment today, total treatment time and time spent performing 1:1 timed codes.       Home Program:    Specific activities to address:  Writing, work tasks (Realtor)  There has been a fairly fast progression of weakness.  Keeps dropping things. Mainly weakness, sometimes some paresthesias    Pain management  Thumb web release with clip (prior therapy episode)  Followed by Dr Antony, and Neurology, and Dr Mills in [place]    Orthoses:  Has custom wrist splints, custom thumb CMC splints  L wrist Futuro compression brace  R wrist brace OTC with padding  B thumb low profile OTC brace    Adaptive aides / modifications/ Joint Protection:  Some prior instruction in JPE/energy conservation  SI belt, AFOs  Modifications for jars, use 2 hands, keep wrists neutral)  Massage tools, key turners, book earl/propping    Exercises / stabilization:  Hand therapy in 2020/2021: R UE nerve issues, had ganglion that improved (non op)  Uses bands and free wts for shoulders (PT); Doing neck exercises from Dr Mills  Using 2# and 5# for strengthening, with wrist weights and not a tight   Ok to use  balls, finger gripping mainly  Ok to use finger 5 band extension exerciser - slide the loops down to P1 of the thumb, down the fingers if able    Home program:  Wear OTC (Futuro) braces as needed, and during exercises for other UB joints.  Modified exercises (keep ganglion calm). Working to address strength    Next Visit:  Check response to exercises and feasibility of using the hand exercisers she has. Pt also has a lot of PT exercises; trying to keep hand exercises to those that can be snuck in easily      Strength   (Measured in pounds)  Pain Report: - none  + mild    ++ moderate    +++ severe    9/19/2023 9/19/2023 11/15/2023      Trials Left Right L R   1  2  3 50  47  35   53  45  35 77  75  63 76  84  83   Average 44 44.3       Lat Pinch 9/19/2023  9/19/2023 L R   Trials Left Right 11/15/2023      1  2   13+, thumb  11   16+, thumb  13   20 18   Average 12 14.5       3 Pt Pinch 9/19/2023 9/19/2023 11/15/2023    Trials Left Right L R   1  2 7+  7+ 8+  8+   13.5 13.5   Average 7 8

## 2023-11-20 ENCOUNTER — THERAPY VISIT (OUTPATIENT)
Dept: PHYSICAL THERAPY | Facility: CLINIC | Age: 43
End: 2023-11-20
Attending: FAMILY MEDICINE
Payer: COMMERCIAL

## 2023-11-20 DIAGNOSIS — Q79.60 EHLERS-DANLOS SYNDROME: Primary | ICD-10-CM

## 2023-11-20 PROCEDURE — 97140 MANUAL THERAPY 1/> REGIONS: CPT | Mod: GP | Performed by: PHYSICAL THERAPIST

## 2023-11-20 PROCEDURE — 97110 THERAPEUTIC EXERCISES: CPT | Mod: GP | Performed by: PHYSICAL THERAPIST

## 2023-12-16 ENCOUNTER — TRANSFERRED RECORDS (OUTPATIENT)
Dept: MULTI SPECIALTY CLINIC | Facility: CLINIC | Age: 43
End: 2023-12-16

## 2023-12-16 LAB — PAP SMEAR - HIM PATIENT REPORTED: NORMAL

## 2024-01-09 ENCOUNTER — THERAPY VISIT (OUTPATIENT)
Dept: PHYSICAL THERAPY | Facility: CLINIC | Age: 44
End: 2024-01-09
Attending: FAMILY MEDICINE
Payer: COMMERCIAL

## 2024-01-09 DIAGNOSIS — Q79.60 EHLERS-DANLOS SYNDROME: Primary | ICD-10-CM

## 2024-01-09 PROCEDURE — 97140 MANUAL THERAPY 1/> REGIONS: CPT | Mod: GP | Performed by: PHYSICAL THERAPIST

## 2024-01-09 PROCEDURE — 97530 THERAPEUTIC ACTIVITIES: CPT | Mod: GP | Performed by: PHYSICAL THERAPIST

## 2024-01-09 PROCEDURE — 97110 THERAPEUTIC EXERCISES: CPT | Mod: GP | Performed by: PHYSICAL THERAPIST

## 2024-01-24 ENCOUNTER — THERAPY VISIT (OUTPATIENT)
Dept: PHYSICAL THERAPY | Facility: CLINIC | Age: 44
End: 2024-01-24
Attending: FAMILY MEDICINE
Payer: COMMERCIAL

## 2024-01-24 DIAGNOSIS — Q79.60 EHLERS-DANLOS SYNDROME: Primary | ICD-10-CM

## 2024-01-24 PROCEDURE — 97530 THERAPEUTIC ACTIVITIES: CPT | Mod: GP | Performed by: PHYSICAL THERAPIST

## 2024-01-24 PROCEDURE — 97140 MANUAL THERAPY 1/> REGIONS: CPT | Mod: GP | Performed by: PHYSICAL THERAPIST

## 2024-01-26 PROBLEM — M79.644 BILATERAL THUMB PAIN: Status: RESOLVED | Noted: 2020-08-04 | Resolved: 2024-01-26

## 2024-01-26 PROBLEM — M79.645 BILATERAL THUMB PAIN: Status: RESOLVED | Noted: 2020-08-04 | Resolved: 2024-01-26

## 2024-01-29 ENCOUNTER — THERAPY VISIT (OUTPATIENT)
Dept: PHYSICAL THERAPY | Facility: CLINIC | Age: 44
End: 2024-01-29
Attending: FAMILY MEDICINE
Payer: COMMERCIAL

## 2024-01-29 DIAGNOSIS — Q79.60 EHLERS-DANLOS SYNDROME: Primary | ICD-10-CM

## 2024-01-29 PROCEDURE — 97110 THERAPEUTIC EXERCISES: CPT | Mod: GP | Performed by: PHYSICAL THERAPIST

## 2024-01-29 PROCEDURE — 97530 THERAPEUTIC ACTIVITIES: CPT | Mod: GP | Performed by: PHYSICAL THERAPIST

## 2024-02-12 ENCOUNTER — THERAPY VISIT (OUTPATIENT)
Dept: PHYSICAL THERAPY | Facility: CLINIC | Age: 44
End: 2024-02-12
Attending: FAMILY MEDICINE
Payer: COMMERCIAL

## 2024-02-12 DIAGNOSIS — Q79.60 EHLERS-DANLOS SYNDROME: Primary | ICD-10-CM

## 2024-02-12 PROCEDURE — 97530 THERAPEUTIC ACTIVITIES: CPT | Mod: GP | Performed by: PHYSICAL THERAPIST

## 2024-02-12 NOTE — PATIENT INSTRUCTIONS
----- Message from Ashley Chopra MA sent at 4/4/2017  2:32 PM EDT -----  Contact: PATIENT      ----- Message -----     From: Camille Weinberg     Sent: 4/4/2017   2:04 PM       To: Ashley Chopra MA    PATIENT'S  WAS JUST TESTED POSITIVE FOR FLU B. HE WAS SEEN AT THE [facility] AND WAS TOLD SHE NEEDED TO CALL HERE AND HAVE MINH CALLED IN SINCE DR. MAURICOI IS HER PRIMARY. [facility] SHE WOULD LIKE A CALL WHEN ITS BEEN DONE THAT WAY SHE CAN  BOTH HER MEDICINE AND HER HUSBANDS   02/12/24   - Focus on hydration: Start with 60oz of water/day.   - Add electrolytes to your water - LMNT (naturally sugar free) or liquid IV (buy sugar free option)    - [place] water bottle - many different sizes  - Call to schedule compression appointment:    [employer]  Orthotist: Bernadette Zavala  Office: [phone], and select option #2 for Orthotics - call to schedule at any site  Fax: [phone]    Monday: [facility] [place]  Wednesday: [place]  Tuesday, Thursday, Friday: [place] ([facility]  Orthotics & Prosthetics  [address], [address], [place] [address])

## 2024-02-17 ENCOUNTER — HEALTH MAINTENANCE LETTER (OUTPATIENT)
Age: 44
End: 2024-02-17

## 2024-02-26 ENCOUNTER — THERAPY VISIT (OUTPATIENT)
Dept: PHYSICAL THERAPY | Facility: CLINIC | Age: 44
End: 2024-02-26
Attending: FAMILY MEDICINE
Payer: COMMERCIAL

## 2024-02-26 DIAGNOSIS — Q79.60 EHLERS-DANLOS SYNDROME: Primary | ICD-10-CM

## 2024-02-26 PROCEDURE — 97110 THERAPEUTIC EXERCISES: CPT | Mod: GP | Performed by: PHYSICAL THERAPIST

## 2024-03-26 ENCOUNTER — THERAPY VISIT (OUTPATIENT)
Dept: PHYSICAL THERAPY | Facility: CLINIC | Age: 44
End: 2024-03-26
Attending: FAMILY MEDICINE
Payer: COMMERCIAL

## 2024-03-26 DIAGNOSIS — Q79.60 EHLERS-DANLOS SYNDROME: Primary | ICD-10-CM

## 2024-03-26 PROCEDURE — 97110 THERAPEUTIC EXERCISES: CPT | Mod: GP | Performed by: PHYSICAL THERAPIST

## 2024-04-03 ENCOUNTER — MYC MEDICAL ADVICE (OUTPATIENT)
Dept: INTERNAL MEDICINE | Facility: CLINIC | Age: 44
End: 2024-04-03

## 2024-04-03 ENCOUNTER — LAB (OUTPATIENT)
Dept: LAB | Facility: CLINIC | Age: 44
End: 2024-04-03
Payer: COMMERCIAL

## 2024-04-03 ENCOUNTER — OFFICE VISIT (OUTPATIENT)
Dept: INTERNAL MEDICINE | Facility: CLINIC | Age: 44
End: 2024-04-03
Payer: COMMERCIAL

## 2024-04-03 ENCOUNTER — TELEPHONE (OUTPATIENT)
Dept: INTERNAL MEDICINE | Facility: CLINIC | Age: 44
End: 2024-04-03

## 2024-04-03 VITALS
HEART RATE: 82 BPM | BODY MASS INDEX: 26.41 KG/M2 | OXYGEN SATURATION: 98 % | SYSTOLIC BLOOD PRESSURE: 161 MMHG | DIASTOLIC BLOOD PRESSURE: 100 MMHG | WEIGHT: 163.6 LBS

## 2024-04-03 DIAGNOSIS — G89.29 CHRONIC BILATERAL LOW BACK PAIN, UNSPECIFIED WHETHER SCIATICA PRESENT: ICD-10-CM

## 2024-04-03 DIAGNOSIS — M53.3 SACROILIAC JOINT PAIN: ICD-10-CM

## 2024-04-03 DIAGNOSIS — M54.50 CHRONIC BILATERAL LOW BACK PAIN, UNSPECIFIED WHETHER SCIATICA PRESENT: ICD-10-CM

## 2024-04-03 DIAGNOSIS — H54.7 VISION PROBLEM: ICD-10-CM

## 2024-04-03 DIAGNOSIS — R10.32 LEFT LOWER QUADRANT PAIN: ICD-10-CM

## 2024-04-03 DIAGNOSIS — R14.0 ABDOMINAL BLOATING: ICD-10-CM

## 2024-04-03 DIAGNOSIS — K58.2 IRRITABLE BOWEL SYNDROME WITH BOTH CONSTIPATION AND DIARRHEA: ICD-10-CM

## 2024-04-03 DIAGNOSIS — R10.2 PELVIC PAIN IN FEMALE: ICD-10-CM

## 2024-04-03 DIAGNOSIS — R00.9 HEART RATE PROBLEM: ICD-10-CM

## 2024-04-03 DIAGNOSIS — Q79.60 EDS (EHLERS-DANLOS SYNDROME): Primary | ICD-10-CM

## 2024-04-03 DIAGNOSIS — R68.89 NECK PROBLEM: ICD-10-CM

## 2024-04-03 PROBLEM — E78.5 HYPERLIPIDEMIA: Status: ACTIVE | Noted: 2021-11-23

## 2024-04-03 LAB
ALBUMIN UR-MCNC: NEGATIVE MG/DL
ANION GAP SERPL CALCULATED.3IONS-SCNC: 11 MMOL/L (ref 7–15)
APPEARANCE UR: CLEAR
BACTERIA #/AREA URNS HPF: ABNORMAL /HPF
BASOPHILS # BLD AUTO: 0 10E3/UL (ref 0–0.2)
BASOPHILS NFR BLD AUTO: 1 %
BILIRUB UR QL STRIP: NEGATIVE
BUN SERPL-MCNC: 13.5 MG/DL (ref 6–20)
CALCIUM SERPL-MCNC: 9.7 MG/DL (ref 8.6–10)
CHLORIDE SERPL-SCNC: 103 MMOL/L (ref 98–107)
COLOR UR AUTO: ABNORMAL
CREAT SERPL-MCNC: 0.71 MG/DL (ref 0.51–0.95)
CRP SERPL-MCNC: <3 MG/L
DEPRECATED HCO3 PLAS-SCNC: 25 MMOL/L (ref 22–29)
EGFRCR SERPLBLD CKD-EPI 2021: >90 ML/MIN/1.73M2
EOSINOPHIL # BLD AUTO: 0.2 10E3/UL (ref 0–0.7)
EOSINOPHIL NFR BLD AUTO: 3 %
ERYTHROCYTE [DISTWIDTH] IN BLOOD BY AUTOMATED COUNT: 12.6 % (ref 10–15)
GLUCOSE SERPL-MCNC: 91 MG/DL (ref 70–99)
GLUCOSE UR STRIP-MCNC: NEGATIVE MG/DL
HCT VFR BLD AUTO: 42.3 % (ref 35–47)
HGB BLD-MCNC: 14 G/DL (ref 11.7–15.7)
HGB UR QL STRIP: NEGATIVE
IMM GRANULOCYTES # BLD: 0 10E3/UL
IMM GRANULOCYTES NFR BLD: 0 %
KETONES UR STRIP-MCNC: NEGATIVE MG/DL
LEUKOCYTE ESTERASE UR QL STRIP: NEGATIVE
LYMPHOCYTES # BLD AUTO: 1.9 10E3/UL (ref 0.8–5.3)
LYMPHOCYTES NFR BLD AUTO: 30 %
MCH RBC QN AUTO: 29.6 PG (ref 26.5–33)
MCHC RBC AUTO-ENTMCNC: 33.1 G/DL (ref 31.5–36.5)
MCV RBC AUTO: 89 FL (ref 78–100)
MONOCYTES # BLD AUTO: 0.4 10E3/UL (ref 0–1.3)
MONOCYTES NFR BLD AUTO: 7 %
NEUTROPHILS # BLD AUTO: 3.7 10E3/UL (ref 1.6–8.3)
NEUTROPHILS NFR BLD AUTO: 59 %
NITRATE UR QL: NEGATIVE
NRBC # BLD AUTO: 0 10E3/UL
NRBC BLD AUTO-RTO: 0 /100
PH UR STRIP: 6 [PH] (ref 5–7)
PLATELET # BLD AUTO: 266 10E3/UL (ref 150–450)
POTASSIUM SERPL-SCNC: 4.1 MMOL/L (ref 3.4–5.3)
RBC # BLD AUTO: 4.73 10E6/UL (ref 3.8–5.2)
RBC URINE: <1 /HPF
SODIUM SERPL-SCNC: 139 MMOL/L (ref 135–145)
SP GR UR STRIP: 1.01 (ref 1–1.03)
SQUAMOUS EPITHELIAL: 1 /HPF
UROBILINOGEN UR STRIP-MCNC: NORMAL MG/DL
WBC # BLD AUTO: 6.2 10E3/UL (ref 4–11)
WBC URINE: 1 /HPF

## 2024-04-03 PROCEDURE — 81001 URINALYSIS AUTO W/SCOPE: CPT | Performed by: PATHOLOGY

## 2024-04-03 PROCEDURE — 36415 COLL VENOUS BLD VENIPUNCTURE: CPT | Performed by: PATHOLOGY

## 2024-04-03 PROCEDURE — 86140 C-REACTIVE PROTEIN: CPT | Performed by: PATHOLOGY

## 2024-04-03 PROCEDURE — 99417 PROLNG OP E/M EACH 15 MIN: CPT | Performed by: PEDIATRICS

## 2024-04-03 PROCEDURE — 85025 COMPLETE CBC W/AUTO DIFF WBC: CPT | Performed by: PATHOLOGY

## 2024-04-03 PROCEDURE — 99205 OFFICE O/P NEW HI 60 MIN: CPT | Performed by: PEDIATRICS

## 2024-04-03 PROCEDURE — 80048 BASIC METABOLIC PNL TOTAL CA: CPT | Performed by: PATHOLOGY

## 2024-04-03 RX ORDER — EPINEPHRINE 0.3 MG/.3ML
INJECTION SUBCUTANEOUS
COMMUNITY
Start: 2022-04-29

## 2024-04-03 NOTE — TELEPHONE ENCOUNTER
Spoke with Ray to get fax number. CT faxed to [phone].  Diana MORFIN LPN  [facility]

## 2024-04-03 NOTE — PROGRESS NOTES
"Dear patient. Thank you for visiting with me. I want you to feel respected, understood, and empowered. \"Respect\" is valuing you as much as I would a close family member. \"Empowerment\" happens when you are fully informed, and can make the best possible decision for you.  Please ask me questions!  Challenge anything that is not clear.    Medical records are primarily used as memory aids for me and my colleagues. Things to know about my documentation style:  - The 'problem list' includes current symptoms or diagnoses, and some problems that are resolved but may return. I use the past medical history for problems not expected to return.  - I use single quotation marks for things that you or I said, when I want to clarify who was speaking.  - I use double quotation marks when copying a term from elsewhere in your records. Italics (besides here) may also denote a quotation.  If you have questions or concerns, please contact me; I will reply as soon as time allows.    Melina Vazquez is a 43 year old woman, seen with her , with concerns including:  Patient presents with:  Establish Care: Pt here to establish care and discuss long covid concerns (and how they interact with EDS symptoms). Pt also has concerns over dysautonomia especially since bout with covid.  Musculoskeletal Problem: Pt reports hip and foot pains that have persisted over the last year. She reports that there has been discussion of amputating foot but is worried that the connective tissue disorder would prevent that.       PCP: No Ref-Primary, Physician   Visit type: problem-oriented    Disposition comment: Melina was referred to me by Dr. Antony for complex primary care. I advised them they can continue to come see me, and I can even be her primary care provider, but I am concerned that her needs may not be met by this clinic's workflow and capacity at this clinic. I don't have a further recommendation, other than discussing with Dr. Antony (who " keeps tabs on many things in the [facility]).  They may be able to find a likely PCP, and I can act as a consultant.    Assessment & Plan        Problem List as of 4/3/2024 Reviewed: 4/3/2024  9:41 PM by Celso Hyatt MD            Noted    1. EDS (Dalton-Danlos syndrome) - Primary 8/4/2020     Overview Signed 4/3/2024  9:27 PM by Celso Hyatt MD      Diagnosed by Dr. Marium Antony (records not available)          Last Assessment & Plan 4/3/2024 Office Visit Written 4/3/2024  9:28 PM by Celso Hyatt MD      Melina has known EDS. Her diagnosis was done previously by Dr. Antony, so I don't need to document this unless there is some future advantage to having it in our records.         2. Pelvic pain in female 9/23/2011     Overview Addendum 4/3/2024  9:29 PM by Celso Hyatt MD      History of pain in RLQ and LLQ at different times.          Last Assessment & Plan 4/3/2024 Office Visit Edited 4/3/2024  9:42 PM by Celso Hyatt MD      This is her highest priority problem. She had LLQ pelvis pain, beginning in December 2023, developed distended abdomen, and gained 10 pounds within a few days. She was evaluated by ER and imaging showed a kidney stone versus a phlebolith. She was seen by OB who 'dismissed everything.' We don't have records of this visit.  The pain improved somewhat with time, although she remains bloated.  Imaging:   US (12/10/23)  1.  Complex corpus luteum left ovary likely due to internal hemorrhage.   2.  Small amount of free fluid in the pelvis.   ...  CT (12/10/23)  1.  There is a new 4 mm calcification in the low left pelvis in the region of the ureterovesical junction. This could reflect a pelvic phlebolith, however, a distal ureteral stone cannot be excluded. There is no associated hydronephrosis.   2.  No intrarenal calculi.   3.  Constipation.   RE: the constipation - we only have a single image from the outside, and constipation looks modest  to moderate at most - there is a lot of intra-stool gas in various parts of the colon.   She has a colonoscopy scheduled next week.       OBJECTIVE: relaxed abdominal wall permitted even more than the usual exam for people with hypermobility. Baseline BS normal. LLQ colon palpable and mostly empty [she just had a BM in the last several hours], nontender. RLQ modest gassy prominence with increased BS afterwards.  She is able to localize pain site deep in left pelvis. I can feel her adnexa and what was probably the ovary with sliding back and forth [this is not usually possible without a bimanual exam, but is sometimes possible with women with hypermobility who are able to relax abd wall]  SI pain does not radiate or replicate       ASSESSMENT: I suspect her problem was ovarian, based on reproducible discomfort. Repeat imaging can be done as considered earlier. If pain recurs she should do what she did today, and verify location. Otherwise should not prod at that site (explained to  about similarity to testicular pain).  Explained about pelvic congestion, which is possible but unlikely given description above. Consider further evaluation if needed.       PLAN:    -- Labs, CT.  -- await colonoscopy. DDx diverticulitis was possible although pattern/presentation would have been unusual  -- advised full strong effort with colonoscopy prep. People with constipation often have residual, with the result that another scope needs to be done soon.         3. Irritable bowel syndrome with both constipation and diarrhea 4/3/2024     Last Assessment & Plan 4/3/2024 Office Visit Edited 4/3/2024  9:40 PM by Celso Hyatt MD      We reviewed bowel history. She alternates between 'complete constipation' and diarrhea. Has mucosy stuff sometimes. Has a colonoscopy scheduled for next week.  See pelvic pain         4. Abdominal bloating 4/3/2024     Overview Addendum 4/3/2024  9:43 PM by Celso Hyatt MD       Separate from IBS-C/D  Discuss other possibilities with diet.          Last Assessment & Plan 4/3/2024 Office Visit Written 4/3/2024  9:43 PM by Celso Hyatt MD      See pelvic pain. Working on further evaluation.  Discuss other possibilities with diet.         5. Chronic bilateral low back pain 4/3/2024     Overview Signed 4/3/2024  9:44 PM by Celso Hyatt MD      Needs further discussion         6. Sacroiliac joint pain 4/3/2024     Last Assessment & Plan 4/3/2024 Office Visit Written 4/3/2024  9:45 PM by Celso Hyatt MD      Brief discussion.   Left SI pain noted today (see pelvic pain). Will need more evaluation and review.         7. Neck problem (query craniocervical instability) 4/3/2024     Overview Signed 4/3/2024  9:46 PM by Celso Hyatt MD      Needs further discussion          Last Assessment & Plan 4/3/2024 Office Visit Written 4/3/2024  9:46 PM by Celso Hyatt MD      Needs further discussion         8. Vision problem 4/3/2024     Overview Signed 4/3/2024  9:46 PM by Celso Hyatt MD      Needs further discussion          Last Assessment & Plan 4/3/2024 Office Visit Written 4/3/2024  9:46 PM by Celso Hyatt MD      Needs further discussion         9. Heart rate problem 4/3/2024     Overview Signed 4/3/2024  9:47 PM by Celso Hyatt MD      reports fluctuation. Needs further discussion          Last Assessment & Plan 4/3/2024 Office Visit Written 4/3/2024  9:47 PM by Celso Hyatt MD      reports fluctuation. Needs further discussion                        Additional issues:  Weakness in extremities mentioned on welcome page  fragile skin mentioned on welcome page  PFO mentioned on welcome page  pharmacogenetics mentioned on welcome page (CYP-2D6 poor metabolism)  Increased risk for blood clots mentioned on welcome page    ADDENDUM: labs are back and reassuring.      Sent myChart:    Hello,  It was good to  "meet you both today. I am writing with some comments about your recent tests, and next steps.    C-REACTIVE PROTEIN (CRP) was normal       Comment: CRP is a specialized protein that scales (up and down) with certain types of inflammation. You may see it referred to as an \"acute phase reactant.\" Sometimes we also do an erthyrocyte sedimentation rate (ESR), but that test is typically less useful than CRP.  Note that some providers will use the word \"inflammation\" beyond that measured by CRP and ESR. This is a long story that you can ask me about when we have time.     BMP (\"Basic metabolic panel,\" or chemistry panel) was normal  This is reassuring.        Comment: This test measures electrolytes and kidney function (creatinine and blood urea nitrogen or BUN) - giving us important information about several biological processes.     COMPLETE BLOOD COUNT (or CBC) was normal, which is reassuring.       Comment: This test provides information about the three major cell lines in the blood. Red blood cells (RBCs) carry oxygen around the body; when we don't have enough RBCs we call that  anemia.  Cells that fight infection are called leukocytes or white blood cells (WBCs) because they have a clearish color under the microscope. The  differential  values tell about the various subtypes of WBCs. Platelets are smaller cells that are partially responsible for blood clotting. The other values on the CBC comment on the three cell lines. It is common for CBC numbers to be slightly abnormal without any significance for your health.    URINALYSIS was normal.  Incidentally, you may notice that bacteria were seen on the urinalysis. This is fairly common unless a catheter was used to collect the urine sample. The important thing about the bacteria is that they aren't concerning unless:       (a) there are leukocytes (the cells that fight infection, also known as WBCs),       (b) the blood count shows elevated white blood cells, or       " (c) a person has certain problems like burning or itching upon urination.   Even if this last point occurs, bacteria in the urine without inflammation may be more likely to be a sign of vaginal problems than a bladder infection.    It is unlikely that you have kidney stones (at least not any that are causing problems)    My recommendations:  1. CT scan as was ordered  2. If the pain recurs, see if it is in the same place.  3. We should meet again after the CT scan.  4. Please try to find a friendly primary care provider, who we can work with. I'm sorry I don't have any suggestions :(        If you have any questions or concerns, we can discuss at your next visit.    Santosh Hyatt MD  Internal Medicine & Pediatrics  [facility], [facility]           Time note (([id number], '): The total of my time (on the date of service) for this service was 95 minutes, including discussion/face-to-face, chart review, interpretation not otherwise reported, documentation, and updating of the computerized record.       Ricky Summers is a 43 year old, presenting for the following health issues:  Establish Care (Pt here to establish care and discuss long covid concerns (and how they interact with EDS symptoms). Pt also has concerns over dysautonomia especially since bout with covid.) and Musculoskeletal Problem (Pt reports hip and foot pains that have persisted over the last year. She reports that there has been discussion of amputating foot but is worried that the connective tissue disorder would prevent that. )        4/3/2024     9:47 AM   Additional Questions   Roomed by PB DIANA     Via the Health Maintenance questionnaire, the patient has reported the following services have been completed -Mammogram-Cervical Cancer Screening, this information has been sent to the abstraction team.  History of Present Illness       Reason for visit:  Eds + long covid + pain left pelvis and  abdomen  Symptom onset:  More than a month  Symptoms include:  Pain; distension; lethargy and weakness  Symptom intensity:  Severe  Symptom progression:  Worsening  Had these symptoms before:  Yes  Has tried/received treatment for these symptoms:  Yes  Previous treatment was successful:  No  What makes it worse:  Standing; walking; bening over ; everything but laying down  What makes it better:  Laying down    She eats 2-3 servings of fruits and vegetables daily.She consumes 0 sweetened beverage(s) daily.She exercises with enough effort to increase her heart rate 20 to 29 minutes per day.  She exercises with enough effort to increase her heart rate 5 days per week.   She is taking medications regularly.         Objective    BP (!) 161/100 (BP Location: Right arm, Patient Position: Sitting, Cuff Size: Adult Regular)   Pulse 82   Wt 74.2 kg (163 lb 9.6 oz)   SpO2 98%   BMI 26.41 kg/m    Body mass index is 26.41 kg/m .  Physical Exam  Constitutional:       General: She is not in acute distress.     Appearance: Normal appearance. She is not ill-appearing.   HENT:      Head: Normocephalic.      Nose: Nose normal.   Eyes:      General: No scleral icterus.        Right eye: No discharge.         Left eye: No discharge.      Extraocular Movements: Extraocular movements intact.   Pulmonary:      Effort: Pulmonary effort is normal. No respiratory distress.   Neurological:      Mental Status: She is alert.   Psychiatric:         Mood and Affect: Mood normal.         Behavior: Behavior normal.                    Signed Electronically by: Celso Hyatt MD

## 2024-04-03 NOTE — TELEPHONE ENCOUNTER
[facility] Call Center    Phone Message    May a detailed message be left on voicemail: yes     Reason for Call: Other: Patient requesting CT scan be sent over to [facility], please call patient once it is sent over. Please review because she is looking to schedule.      Action Taken: Message routed to:  [facility]    Travel Screening: Not Applicable

## 2024-04-04 ENCOUNTER — TRANSFERRED RECORDS (OUTPATIENT)
Dept: HEALTH INFORMATION MANAGEMENT | Facility: CLINIC | Age: 44
End: 2024-04-04
Payer: COMMERCIAL

## 2024-04-04 NOTE — ASSESSMENT & PLAN NOTE
Melina has known EDS. Her diagnosis was done previously by Dr. Antony, so I don't need to document this unless there is some future advantage to having it in our records.

## 2024-04-04 NOTE — ASSESSMENT & PLAN NOTE
Brief discussion.   Left SI pain noted today (see pelvic pain). Will need more evaluation and review.

## 2024-04-04 NOTE — ASSESSMENT & PLAN NOTE
We reviewed bowel history. She alternates between 'complete constipation' and diarrhea. Has mucosy stuff sometimes. Has a colonoscopy scheduled for next week.  See pelvic pain

## 2024-04-04 NOTE — ASSESSMENT & PLAN NOTE
This is her highest priority problem. She had LLQ pelvis pain, beginning in December 2023, developed distended abdomen, and gained 10 pounds within a few days. She was evaluated by ER and imaging showed a kidney stone versus a phlebolith. She was seen by OB who 'dismissed everything.' We don't have records of this visit.  The pain improved somewhat with time, although she remains bloated.  Imaging:   US (12/10/23)  1.  Complex corpus luteum left ovary likely due to internal hemorrhage.   2.  Small amount of free fluid in the pelvis.   ...  CT (12/10/23)  1.  There is a new 4 mm calcification in the low left pelvis in the region of the ureterovesical junction. This could reflect a pelvic phlebolith, however, a distal ureteral stone cannot be excluded. There is no associated hydronephrosis.   2.  No intrarenal calculi.   3.  Constipation.   RE: the constipation - we only have a single image from the outside, and constipation looks modest to moderate at most - there is a lot of intra-stool gas in various parts of the colon.   She has a colonoscopy scheduled next week.       OBJECTIVE: relaxed abdominal wall permitted even more than the usual exam for people with hypermobility. Baseline BS normal. LLQ colon palpable and mostly empty [she just had a BM in the last several hours], nontender. RLQ modest gassy prominence with increased BS afterwards.  She is able to localize pain site deep in left pelvis. I can feel her adnexa and what was probably the ovary with sliding back and forth [this is not usually possible without a bimanual exam, but is sometimes possible with women with hypermobility who are able to relax abd wall]  SI pain does not radiate or replicate       ASSESSMENT: I suspect her problem was ovarian, based on reproducible discomfort. Repeat imaging can be done as considered earlier. If pain recurs she should do what she did today, and verify location. Otherwise should not prod at that site (explained to   about similarity to testicular pain).  Explained about pelvic congestion, which is possible but unlikely given description above. Consider further evaluation if needed.       PLAN:    -- Labs, CT.  -- await colonoscopy. DDx diverticulitis was possible although pattern/presentation would have been unusual  -- advised full strong effort with colonoscopy prep. People with constipation often have residual, with the result that another scope needs to be done soon.

## 2024-04-04 NOTE — TELEPHONE ENCOUNTER
[facility] called- they want to know if they can do just with contrast, Dr. Edmar miller'd. They will proceed with scan.     Jorge Luis Velasquez RN on 4/4/2024 at 7:56 AM

## 2024-04-12 ENCOUNTER — VIRTUAL VISIT (OUTPATIENT)
Dept: INTERNAL MEDICINE | Facility: CLINIC | Age: 44
End: 2024-04-12
Payer: COMMERCIAL

## 2024-04-12 DIAGNOSIS — R53.82 CHRONIC FATIGUE: ICD-10-CM

## 2024-04-12 DIAGNOSIS — R14.0 ABDOMINAL BLOATING: Primary | ICD-10-CM

## 2024-04-12 PROCEDURE — 99215 OFFICE O/P EST HI 40 MIN: CPT | Mod: 95 | Performed by: PEDIATRICS

## 2024-04-12 ASSESSMENT — PATIENT HEALTH QUESTIONNAIRE - PHQ9: SUM OF ALL RESPONSES TO PHQ QUESTIONS 1-9: 9

## 2024-04-12 NOTE — ASSESSMENT & PLAN NOTE
Abd pain is still present.   Reported colonoscopy was normal. Saw a GI provider who knows about EDS. Also thinking about doing a gastric emptying study.  CT was done (counseled about this)  1. No acute intra-abdominal or pelvic processes by CT.  2. No hydroureteronephrosis. Few bilateral subcentimeter calcification within the pelvis are similar to prior exam, most of which are phleboliths. Redemonstrated calcification near the left ureterovesical junction is unchanged and could also be additional phlebolith. If there is continued clinical concern for a distal ureteral stone, consider CT IVP with delayed contrast enhanced images that allows opacification of the ureters.  3. Lumbar spondylosis with varying degrees of foraminal stenosis. Consider noncontrast MRI of the lumbar spine, if clinically indicated.  Also noticed moderately large stool burden.       ASSESSMENT and PLAN: multietiology abdominal pain. Talked about splitting problems, to focus treatment and  benefit. Should continue with [facility]. I recommend she work on reducing stool burden, since this can affect many of her other problems.   current weight

## 2024-04-12 NOTE — PROGRESS NOTES
"Dear patient. Thank you for visiting with me. I want you to feel respected, understood, and empowered. \"Respect\" is valuing you as much as I would a close family member. \"Empowerment\" happens when you are fully informed, and can make the best possible decision for you.  Please ask me questions!  Challenge anything that is not clear.    Medical records are primarily used as memory aids for me and my colleagues. Things to know about my documentation style:  - The 'problem list' includes current symptoms or diagnoses, and some problems that are resolved but may return. I use the past medical history for problems not expected to return.  - I use single quotation marks for things that you or I said, when I want to clarify who was speaking.  - I use double quotation marks when copying a term from elsewhere in your records. Italics (besides here) may also denote a quotation.  If you have questions or concerns, please contact me; I will reply as soon as time allows.    Context    PCP: Marium Antony   Visit type: problem-oriented    Melina Vazquez is a 43 year old woman, with concerns including:  Chief Complaint   Patient presents with    RECHECK       History, update, and/or problems    Problem List as of 4/12/2024 Reviewed: 4/3/2024  9:41 PM by Celso Hyatt MD            Noted    1. Abdominal bloating - Primary 4/3/2024     Overview Addendum 4/3/2024  9:43 PM by Celso Hyatt MD      Separate from IBS-C/D  Discuss other possibilities with diet.          Last Assessment & Plan 4/12/2024 Virtual Visit Written 4/12/2024  5:12 PM by Celso Hyatt MD      Abd pain is still present.   Reported colonoscopy was normal. Saw a GI provider who knows about EDS. Also thinking about doing a gastric emptying study.  CT was done (counseled about this)  1. No acute intra-abdominal or pelvic processes by CT.  2. No hydroureteronephrosis. Few bilateral subcentimeter calcification within the pelvis are " similar to prior exam, most of which are phleboliths. Redemonstrated calcification near the left ureterovesical junction is unchanged and could also be additional phlebolith. If there is continued clinical concern for a distal ureteral stone, consider CT IVP with delayed contrast enhanced images that allows opacification of the ureters.  3. Lumbar spondylosis with varying degrees of foraminal stenosis. Consider noncontrast MRI of the lumbar spine, if clinically indicated.  Also noticed moderately large stool burden.       ASSESSMENT and PLAN: multietiology abdominal pain. Talked about splitting problems, to focus treatment and  benefit. Should continue with [facility]. I recommend she work on reducing stool burden, since this can affect many of her other problems.                Additional issues:  Chronic fatigue. We talked about her activity - she works, does realtor work, stages houses. Not doing too much. We talked about gauging her activities, which it sounds like she is already doing.  She asked about previous evaluation she had with different positions of spine and cervical blood vessels. Mentioned both vagal tone, styloids are long and sometimes pinching.  [place]. I am cautiously positive about this option        Time note (e5, 40'): The total time (on the date of service) for this service was 54 minutes, including discussion/face-to-face, chart review, interpretation not otherwise reported, documentation, and updating of the computerized record.        Melina is a 43 year old who is being evaluated via a billable video visit.    How would you like to obtain your AVS? MyChart  If the video visit is dropped, the invitation should be resent by: Send to e-mail at: [email or web address]  Will anyone else be joining your video visit? No          Subjective   Melina is a 43 year old, presenting for the following health issues:  RECHECK    HPI            Objective           Vitals:  No vitals were obtained today due to virtual visit.    Physical Exam  Constitutional:       General: She is not in acute distress.     Appearance: Normal appearance. She is not ill-appearing.   HENT:      Head: Normocephalic.      Right Ear: External ear normal.      Left Ear: External ear normal.      Nose: Nose normal.   Eyes:      General: No scleral icterus (no obvious).        Right eye: No discharge (none obvious).         Left eye: No discharge (none obvious).      Extraocular Movements: Extraocular movements intact.   Pulmonary:      Effort: Pulmonary effort is normal. No respiratory distress.      Comments: No audible wheeze or stridor. No visible cyanosis.  Skin:     Comments: Visible skin is clear, without obvious rash or lesions   Neurological:      Mental Status: She is alert.      Comments: Cranial nerves appear grossly normal   Psychiatric:         Mood and Affect: Mood normal.         Speech: Speech normal.         Behavior: Behavior normal.         Thought Content: Thought content normal.                  Video-Visit Details  Start Time: 1105  End Time:11:48 AM    Type of service:  Video Visit   Originating Location (pt. Location): Home    Distant Location (provider location):  Off-site  Platform used for Video Visit: Deanna  Signed Electronically by: Celso Hyatt MD

## 2024-04-24 ENCOUNTER — THERAPY VISIT (OUTPATIENT)
Dept: PHYSICAL THERAPY | Facility: CLINIC | Age: 44
End: 2024-04-24
Attending: FAMILY MEDICINE
Payer: COMMERCIAL

## 2024-04-24 DIAGNOSIS — Q79.60 EHLERS-DANLOS SYNDROME: Primary | ICD-10-CM

## 2024-04-24 PROCEDURE — 97530 THERAPEUTIC ACTIVITIES: CPT | Mod: GP | Performed by: PHYSICAL THERAPIST

## 2024-04-24 PROCEDURE — 97110 THERAPEUTIC EXERCISES: CPT | Mod: GP | Performed by: PHYSICAL THERAPIST

## 2024-05-06 ENCOUNTER — THERAPY VISIT (OUTPATIENT)
Dept: PHYSICAL THERAPY | Facility: CLINIC | Age: 44
End: 2024-05-06
Attending: FAMILY MEDICINE
Payer: COMMERCIAL

## 2024-05-06 DIAGNOSIS — Q79.60 EHLERS-DANLOS SYNDROME: Primary | ICD-10-CM

## 2024-05-06 PROCEDURE — 97530 THERAPEUTIC ACTIVITIES: CPT | Mod: GP | Performed by: PHYSICAL THERAPIST

## 2024-05-06 NOTE — PATIENT INSTRUCTIONS
5/6/24   - Get compression socks to reduce swelling in feet.   - Ice feet 2x/day   - CW-X leggings   - Wear AFOs all the time to help with ankle stability

## 2024-05-22 ENCOUNTER — THERAPY VISIT (OUTPATIENT)
Dept: PHYSICAL THERAPY | Facility: CLINIC | Age: 44
End: 2024-05-22
Attending: FAMILY MEDICINE
Payer: COMMERCIAL

## 2024-05-22 DIAGNOSIS — Q79.60 EHLERS-DANLOS SYNDROME: Primary | ICD-10-CM

## 2024-05-22 PROCEDURE — 97110 THERAPEUTIC EXERCISES: CPT | Mod: GP | Performed by: PHYSICAL THERAPIST

## 2024-06-05 ENCOUNTER — THERAPY VISIT (OUTPATIENT)
Dept: PHYSICAL THERAPY | Facility: CLINIC | Age: 44
End: 2024-06-05
Attending: FAMILY MEDICINE
Payer: COMMERCIAL

## 2024-06-05 DIAGNOSIS — Q79.60 EHLERS-DANLOS SYNDROME: Primary | ICD-10-CM

## 2024-06-05 PROCEDURE — 97140 MANUAL THERAPY 1/> REGIONS: CPT | Mod: GP | Performed by: PHYSICAL THERAPIST

## 2024-06-07 ENCOUNTER — THERAPY VISIT (OUTPATIENT)
Dept: PHYSICAL THERAPY | Facility: CLINIC | Age: 44
End: 2024-06-07
Attending: FAMILY MEDICINE
Payer: COMMERCIAL

## 2024-06-07 DIAGNOSIS — Q79.60 EHLERS-DANLOS SYNDROME: Primary | ICD-10-CM

## 2024-06-07 PROCEDURE — 97140 MANUAL THERAPY 1/> REGIONS: CPT | Mod: GP | Performed by: PHYSICAL THERAPIST

## 2024-06-19 ENCOUNTER — THERAPY VISIT (OUTPATIENT)
Dept: PHYSICAL THERAPY | Facility: CLINIC | Age: 44
End: 2024-06-19
Attending: FAMILY MEDICINE
Payer: COMMERCIAL

## 2024-06-19 DIAGNOSIS — Q79.60 EHLERS-DANLOS SYNDROME: Primary | ICD-10-CM

## 2024-06-19 PROCEDURE — 97750 PHYSICAL PERFORMANCE TEST: CPT | Mod: GP | Performed by: PHYSICAL THERAPIST

## 2024-06-19 PROCEDURE — 97140 MANUAL THERAPY 1/> REGIONS: CPT | Mod: GP | Performed by: PHYSICAL THERAPIST

## 2024-06-19 NOTE — PROGRESS NOTES
"[facility] 10 Min Lean Test:  This test is specifically meant to measure a patients tolerance to upright standing for an extended period of time to measure the effects of standing on blood pressure and heart rate.    The patient was asked to normally hydrate prior to the test, not over hydrate, and to present without compression (ie abdominal binder and/or compression stockings) in as much as they could tolerate. If the patient cannot tolerate upright standing without compression present, then they are recommended to wear it as safety is our top priority even if it alters the rest results.     The patient was instructed to report any symptoms during the test; otherwise, no talking was allowed. In addition, if the patient experienced symptoms significant enough to need to lie supine while standing, they were instructed to lie supine in the interest of their safety. The patient was also instructed to resume orthostatic interventions post test ie compression.The patient verbalized understanding.    Procedure:  1) The patient was asked to remove their socks and shoes and lie comfortably on the mat table for 15 minutes to allow them to reach circulatory equilibrium.    2) BP was measured, then again after ~1 minute to see if test results were similar before proceeding to upright standing.    3) Patient stood with their upper thoracic spine/scapula against the wall (lumbar spine off the wall) with heels ~6\" from the baseboard.    4) Time started and BP recorded every 1 minute for 10 minutes. Signs (ex: change in skin color, diaphoresis) and symptoms (ex: lightheadedness, pre-syncope, fatigue) were recorded.    5) In addition, patient was instructed to lie supine after 10 min standing to observe time required to return to initial resting HR and BP.    Results: BP, HR    Supine:  Initial - 59 bpm, 118/78  After 1 min - 60 bpm, 113/74    Standing: initial: 85 bpm - 25bpm increase  After 1 min - 84 bpm, 114/82  2 min - 75 bpm, " 116/83  3 min - 80 bpm, 118/82  4 min - 85bpm, 118/82   5 min - 80 bpm, 118/83  6 min - 81bpm, 118/88       Post Test Supine:  After 1 min - 72 bpm    Assessment - Patient's HR initially increased by 25bpm, but leveled out around 75-80bpm with no symptoms of dizziness or light headedness noted. Negative screen.    Plan - Continue to use compression and hydration when dizzy or as needed to help with symptoms when she has them.     Resource - [facility]. [email or web address]. June 12, 2022. This article references:  SANJIV Mullen (2013), Common Syndromes of Orthostatic Intolerance  Buck Edwards al (1985) Cardiovascular deconditioning during space flight and the use of saline as a countermeasure to orthostatic intolerance  Nora et al (1977) Exercise and heat orthostatism and the effect of heat acclimation and physical fitness.  Vandana, K.H. et al (1975), Comparison of 60 degrees tilt, LBNP, and passive standing as measures of orthostatic intolerance  Camilla et al (2018), Passive standing tests for the office diagnosis of postural tachycardia syndrome: new methodological considerations  Faye et al (2022), Physiological assessment of orthostatic intolerance in chronic fatigue syndrome

## 2024-07-06 ENCOUNTER — HEALTH MAINTENANCE LETTER (OUTPATIENT)
Age: 44
End: 2024-07-06

## 2024-07-15 ENCOUNTER — TRANSCRIBE ORDERS (OUTPATIENT)
Dept: OTHER | Age: 44
End: 2024-07-15

## 2024-07-15 DIAGNOSIS — Q79.60 EDS (EHLERS-DANLOS SYNDROME): Primary | ICD-10-CM

## 2024-07-24 ENCOUNTER — TELEPHONE (OUTPATIENT)
Dept: INTERNAL MEDICINE | Facility: CLINIC | Age: 44
End: 2024-07-24
Payer: COMMERCIAL

## 2024-07-24 NOTE — TELEPHONE ENCOUNTER
[facility] Call Center    Phone Message    May a detailed message be left on voicemail: yes     Reason for Call: Other: Patient states that she would like a call back to discuss.  States that last mammogram showed that breast tissue is extremely dense and inconclusive.  Thinks that she would like an ultrasound.  States that she has some pain and has also had a 3D mammogram.      Action Taken: Other: pcc    Travel Screening: Not Applicable     Date of Service:

## 2024-07-26 NOTE — TELEPHONE ENCOUNTER
Called patient- she is already being seen in OB and will be getting an US. She has been having a burning pain and felt a a lump in breast, agreed this should be evaluated. She will potentially follow up with us after results are in.    Jorge Luis Velasquez RN on 7/26/2024 at 1:05 PM

## 2024-11-21 ENCOUNTER — TRANSCRIBE ORDERS (OUTPATIENT)
Dept: OTHER | Age: 44
End: 2024-11-21

## 2024-11-21 DIAGNOSIS — Q79.60 EHLERS-DANLOS SYNDROME: Primary | ICD-10-CM

## 2025-06-26 ENCOUNTER — TRANSCRIBE ORDERS (OUTPATIENT)
Dept: OTHER | Age: 45
End: 2025-06-26

## 2025-06-26 DIAGNOSIS — M25.30 JOINT INSTABILITY: ICD-10-CM

## 2025-06-26 DIAGNOSIS — Q79.62 HYPERMOBILE EHLERS-DANLOS SYNDROME: Primary | ICD-10-CM

## 2025-06-26 DIAGNOSIS — R52 PAIN: ICD-10-CM

## 2025-07-13 ENCOUNTER — HEALTH MAINTENANCE LETTER (OUTPATIENT)
Age: 45
End: 2025-07-13

## (undated) DEVICE — ENDO TROCAR FIRST ENTRY KII FIOS Z-THRD 05X100MM CTF03

## (undated) DEVICE — ENDO SCOPE WARMER LF TM500

## (undated) DEVICE — SOL NACL 0.9% INJ 1000ML BAG 2B1324X

## (undated) DEVICE — GLOVE PROTEXIS W/NEU-THERA 7.0  2D73TE70

## (undated) DEVICE — ENDO TROCAR SLEEVE KII Z-THREADED 05X100MM CTS02

## (undated) DEVICE — SOL WATER IRRIG 1000ML BOTTLE 2F7114

## (undated) DEVICE — SU VICRYL 0 CT-2 27" J334H

## (undated) DEVICE — ESU GROUND PAD UNIVERSAL W/O CORD

## (undated) DEVICE — KIT PATIENT POSITIONING PIGAZZI LATEX FREE 40580

## (undated) DEVICE — Device

## (undated) DEVICE — CATH TRAY FOLEY SURESTEP 16FR WDRAIN BAG STLK LATEX A300316A

## (undated) DEVICE — SUCTION IRR STRYKERFLOW II W/TIP 250-070-520

## (undated) DEVICE — SU MONOCRYL 4-0 PS-2 18" UND Y496G

## (undated) DEVICE — LINEN TOWEL PACK X5 5464

## (undated) DEVICE — GLOVE PROTEXIS W/NEU-THERA 7.5  2D73TE75

## (undated) DEVICE — PANTIES MESH LG/XLG 2PK 706M2

## (undated) DEVICE — SOL NACL 0.9% IRRIG 1000ML BOTTLE 2F7124

## (undated) DEVICE — NDL INSUFFLATION 13GA 120MM C2201

## (undated) DEVICE — SOL NACL 0.9% IRRIG 3000ML BAG 2B7477

## (undated) DEVICE — ESU PENCIL W/HOLSTER E2350H

## (undated) DEVICE — SUCTION CANISTER BEMIS HI FLOW 006772-901

## (undated) DEVICE — GLOVE PROTEXIS MICRO 6.5  2D73PM65

## (undated) DEVICE — ESU HOLDER LAP INST DISP PURPLE LONG 330MM H-PRO-330

## (undated) DEVICE — SUCTION CANISTER MEDIVAC LINER 3000ML W/LID 65651-530

## (undated) RX ORDER — DEXAMETHASONE SODIUM PHOSPHATE 4 MG/ML
INJECTION, SOLUTION INTRA-ARTICULAR; INTRALESIONAL; INTRAMUSCULAR; INTRAVENOUS; SOFT TISSUE
Status: DISPENSED
Start: 2019-10-04

## (undated) RX ORDER — CEFAZOLIN SODIUM 2 G/100ML
INJECTION, SOLUTION INTRAVENOUS
Status: DISPENSED
Start: 2019-10-04

## (undated) RX ORDER — FENTANYL CITRATE 0.05 MG/ML
INJECTION, SOLUTION INTRAMUSCULAR; INTRAVENOUS
Status: DISPENSED
Start: 2019-10-04

## (undated) RX ORDER — KETOROLAC TROMETHAMINE 30 MG/ML
INJECTION, SOLUTION INTRAMUSCULAR; INTRAVENOUS
Status: DISPENSED
Start: 2019-10-04

## (undated) RX ORDER — PROPOFOL 10 MG/ML
INJECTION, EMULSION INTRAVENOUS
Status: DISPENSED
Start: 2019-10-04

## (undated) RX ORDER — NEOSTIGMINE METHYLSULFATE 1 MG/ML
VIAL (ML) INJECTION
Status: DISPENSED
Start: 2019-10-04

## (undated) RX ORDER — ACETAMINOPHEN 325 MG/1
TABLET ORAL
Status: DISPENSED
Start: 2019-10-04

## (undated) RX ORDER — GLYCOPYRROLATE 0.2 MG/ML
INJECTION, SOLUTION INTRAMUSCULAR; INTRAVENOUS
Status: DISPENSED
Start: 2019-10-04

## (undated) RX ORDER — FENTANYL CITRATE 50 UG/ML
INJECTION, SOLUTION INTRAMUSCULAR; INTRAVENOUS
Status: DISPENSED
Start: 2019-10-04

## (undated) RX ORDER — LIDOCAINE HYDROCHLORIDE 20 MG/ML
INJECTION, SOLUTION EPIDURAL; INFILTRATION; INTRACAUDAL; PERINEURAL
Status: DISPENSED
Start: 2019-10-04

## (undated) RX ORDER — ONDANSETRON 2 MG/ML
INJECTION INTRAMUSCULAR; INTRAVENOUS
Status: DISPENSED
Start: 2019-10-04

## (undated) RX ORDER — VECURONIUM BROMIDE 1 MG/ML
INJECTION, POWDER, LYOPHILIZED, FOR SOLUTION INTRAVENOUS
Status: DISPENSED
Start: 2019-10-04